# Patient Record
Sex: FEMALE | Employment: UNEMPLOYED | URBAN - METROPOLITAN AREA
[De-identification: names, ages, dates, MRNs, and addresses within clinical notes are randomized per-mention and may not be internally consistent; named-entity substitution may affect disease eponyms.]

---

## 2017-03-28 ENCOUNTER — HOSPITAL ENCOUNTER (OUTPATIENT)
Dept: MAMMOGRAPHY | Age: 72
Discharge: HOME | End: 2017-03-28
Attending: INTERNAL MEDICINE
Payer: MEDICARE

## 2017-03-28 DIAGNOSIS — Z12.31 VISIT FOR SCREENING MAMMOGRAM: ICD-10-CM

## 2017-03-28 PROCEDURE — 77063 BREAST TOMOSYNTHESIS BI: CPT

## 2017-06-26 ENCOUNTER — HOSPITAL ENCOUNTER (OUTPATIENT)
Dept: LAB | Age: 72
Discharge: HOME | End: 2017-06-26
Payer: MEDICARE

## 2017-06-26 DIAGNOSIS — E55.9 VITAMIN D DEFICIENCY: ICD-10-CM

## 2017-06-26 DIAGNOSIS — K21.9 GASTROESOPHAGEAL REFLUX DISEASE: ICD-10-CM

## 2017-06-26 DIAGNOSIS — I10 HTN (HYPERTENSION): ICD-10-CM

## 2017-06-26 DIAGNOSIS — E11.9 DIABETES MELLITUS (HCC): ICD-10-CM

## 2017-06-26 DIAGNOSIS — E78.00 HYPERCHOLESTEROLEMIA: ICD-10-CM

## 2017-06-26 LAB
25(OH)D3 SERPL-MCNC: 41 NG/ML (ref 30–100)
ALBUMIN SERPL BCP-MCNC: 3.4 G/DL (ref 3.4–5)
ALBUMIN/GLOB SERPL: 0.9 {RATIO} (ref 0.8–1.7)
ALP SERPL-CCNC: 71 U/L (ref 45–117)
ALT SERPL-CCNC: 15 U/L (ref 13–56)
ANION GAP BLD CALC-SCNC: 6 MMOL/L (ref 3–18)
AST SERPL W P-5'-P-CCNC: 10 U/L (ref 15–37)
BASOPHILS # BLD AUTO: 0 K/UL (ref 0–0.06)
BASOPHILS # BLD: 0 % (ref 0–2)
BILIRUB SERPL-MCNC: 0.6 MG/DL (ref 0.2–1)
BUN SERPL-MCNC: 25 MG/DL (ref 7–18)
BUN/CREAT SERPL: 22 (ref 12–20)
CALCIUM SERPL-MCNC: 9 MG/DL (ref 8.5–10.1)
CHLORIDE SERPL-SCNC: 105 MMOL/L (ref 100–108)
CHOLEST SERPL-MCNC: 216 MG/DL
CO2 SERPL-SCNC: 32 MMOL/L (ref 21–32)
CREAT SERPL-MCNC: 1.15 MG/DL (ref 0.6–1.3)
CREAT UR-MCNC: 199 MG/DL (ref 30–125)
DIFFERENTIAL METHOD BLD: ABNORMAL
EOSINOPHIL # BLD: 0.1 K/UL (ref 0–0.4)
EOSINOPHIL NFR BLD: 3 % (ref 0–5)
ERYTHROCYTE [DISTWIDTH] IN BLOOD BY AUTOMATED COUNT: 13.1 % (ref 11.6–14.5)
GLOBULIN SER CALC-MCNC: 3.8 G/DL (ref 2–4)
GLUCOSE SERPL-MCNC: 126 MG/DL (ref 74–99)
HBA1C MFR BLD: 7.7 % (ref 4.2–5.6)
HCT VFR BLD AUTO: 37 % (ref 35–45)
HDLC SERPL-MCNC: 48 MG/DL (ref 40–60)
HDLC SERPL: 4.5 {RATIO} (ref 0–5)
HGB BLD-MCNC: 11.6 G/DL (ref 12–16)
LDLC SERPL CALC-MCNC: 138.4 MG/DL (ref 0–100)
LIPID PROFILE,FLP: ABNORMAL
LYMPHOCYTES # BLD AUTO: 35 % (ref 21–52)
LYMPHOCYTES # BLD: 1.7 K/UL (ref 0.9–3.6)
MCH RBC QN AUTO: 29.2 PG (ref 24–34)
MCHC RBC AUTO-ENTMCNC: 31.4 G/DL (ref 31–37)
MCV RBC AUTO: 93.2 FL (ref 74–97)
MICROALBUMIN UR-MCNC: 8.71 MG/DL (ref 0–3)
MICROALBUMIN/CREAT UR-RTO: 44 MG/G (ref 0–30)
MONOCYTES # BLD: 0.6 K/UL (ref 0.05–1.2)
MONOCYTES NFR BLD AUTO: 12 % (ref 3–10)
NEUTS SEG # BLD: 2.5 K/UL (ref 1.8–8)
NEUTS SEG NFR BLD AUTO: 50 % (ref 40–73)
PLATELET # BLD AUTO: 275 K/UL (ref 135–420)
PMV BLD AUTO: 9.6 FL (ref 9.2–11.8)
POTASSIUM SERPL-SCNC: 3.8 MMOL/L (ref 3.5–5.5)
PROT SERPL-MCNC: 7.2 G/DL (ref 6.4–8.2)
RBC # BLD AUTO: 3.97 M/UL (ref 4.2–5.3)
SODIUM SERPL-SCNC: 143 MMOL/L (ref 136–145)
T4 SERPL-MCNC: 10.1 UG/DL (ref 4.5–10.9)
TRIGL SERPL-MCNC: 148 MG/DL (ref ?–150)
TSH SERPL DL<=0.05 MIU/L-ACNC: 0.77 UIU/ML (ref 0.36–3.74)
VLDLC SERPL CALC-MCNC: 29.6 MG/DL
WBC # BLD AUTO: 5 K/UL (ref 4.6–13.2)

## 2017-06-26 PROCEDURE — 84436 ASSAY OF TOTAL THYROXINE: CPT | Performed by: INTERNAL MEDICINE

## 2017-06-26 PROCEDURE — 82043 UR ALBUMIN QUANTITATIVE: CPT | Performed by: INTERNAL MEDICINE

## 2017-06-26 PROCEDURE — 36415 COLL VENOUS BLD VENIPUNCTURE: CPT | Performed by: INTERNAL MEDICINE

## 2017-06-26 PROCEDURE — 84443 ASSAY THYROID STIM HORMONE: CPT | Performed by: INTERNAL MEDICINE

## 2017-06-26 PROCEDURE — 80053 COMPREHEN METABOLIC PANEL: CPT | Performed by: INTERNAL MEDICINE

## 2017-06-26 PROCEDURE — 82306 VITAMIN D 25 HYDROXY: CPT | Performed by: INTERNAL MEDICINE

## 2017-06-26 PROCEDURE — 80061 LIPID PANEL: CPT | Performed by: INTERNAL MEDICINE

## 2017-06-26 PROCEDURE — 85025 COMPLETE CBC W/AUTO DIFF WBC: CPT | Performed by: INTERNAL MEDICINE

## 2017-06-26 PROCEDURE — 83036 HEMOGLOBIN GLYCOSYLATED A1C: CPT | Performed by: INTERNAL MEDICINE

## 2018-04-03 ENCOUNTER — HOSPITAL ENCOUNTER (OUTPATIENT)
Dept: LAB | Age: 73
Discharge: HOME | End: 2018-04-03
Payer: MEDICARE

## 2018-04-03 DIAGNOSIS — I10 ESSENTIAL HYPERTENSION, MALIGNANT: ICD-10-CM

## 2018-04-03 DIAGNOSIS — E78.00 PURE HYPERCHOLESTEROLEMIA: ICD-10-CM

## 2018-04-03 DIAGNOSIS — E55.9 AVITAMINOSIS D: ICD-10-CM

## 2018-04-03 DIAGNOSIS — E11.9 DIABETES MELLITUS (HCC): ICD-10-CM

## 2018-04-03 DIAGNOSIS — K21.9 ESOPHAGEAL REFLUX: ICD-10-CM

## 2018-04-03 LAB
ALBUMIN SERPL-MCNC: 3.6 G/DL (ref 3.4–5)
ALBUMIN/GLOB SERPL: 1 {RATIO} (ref 0.8–1.7)
ALP SERPL-CCNC: 76 U/L (ref 45–117)
ALT SERPL-CCNC: 18 U/L (ref 13–56)
ANION GAP SERPL CALC-SCNC: 6 MMOL/L (ref 3–18)
AST SERPL-CCNC: 11 U/L (ref 15–37)
BASOPHILS # BLD: 0 K/UL (ref 0–0.06)
BASOPHILS NFR BLD: 0 % (ref 0–2)
BILIRUB SERPL-MCNC: 0.7 MG/DL (ref 0.2–1)
BUN SERPL-MCNC: 18 MG/DL (ref 7–18)
BUN/CREAT SERPL: 17 (ref 12–20)
CALCIUM SERPL-MCNC: 9.3 MG/DL (ref 8.5–10.1)
CHLORIDE SERPL-SCNC: 102 MMOL/L (ref 100–108)
CHOLEST SERPL-MCNC: 176 MG/DL
CO2 SERPL-SCNC: 33 MMOL/L (ref 21–32)
CREAT SERPL-MCNC: 1.05 MG/DL (ref 0.6–1.3)
DIFFERENTIAL METHOD BLD: ABNORMAL
EOSINOPHIL # BLD: 0.1 K/UL (ref 0–0.4)
EOSINOPHIL NFR BLD: 3 % (ref 0–5)
ERYTHROCYTE [DISTWIDTH] IN BLOOD BY AUTOMATED COUNT: 13 % (ref 11.6–14.5)
EST. AVERAGE GLUCOSE BLD GHB EST-MCNC: 171 MG/DL
GLOBULIN SER CALC-MCNC: 3.7 G/DL (ref 2–4)
GLUCOSE SERPL-MCNC: 124 MG/DL (ref 74–99)
HBA1C MFR BLD: 7.6 % (ref 4.2–5.6)
HCT VFR BLD AUTO: 37.8 % (ref 35–45)
HDLC SERPL-MCNC: 44 MG/DL (ref 40–60)
HDLC SERPL: 4 {RATIO} (ref 0–5)
HGB BLD-MCNC: 12.3 G/DL (ref 12–16)
LDLC SERPL CALC-MCNC: 91.4 MG/DL (ref 0–100)
LIPID PROFILE,FLP: ABNORMAL
LYMPHOCYTES # BLD: 1.9 K/UL (ref 0.9–3.6)
LYMPHOCYTES NFR BLD: 45 % (ref 21–52)
MCH RBC QN AUTO: 29.9 PG (ref 24–34)
MCHC RBC AUTO-ENTMCNC: 32.5 G/DL (ref 31–37)
MCV RBC AUTO: 92 FL (ref 74–97)
MONOCYTES # BLD: 0.5 K/UL (ref 0.05–1.2)
MONOCYTES NFR BLD: 12 % (ref 3–10)
NEUTS SEG # BLD: 1.8 K/UL (ref 1.8–8)
NEUTS SEG NFR BLD: 40 % (ref 40–73)
PLATELET # BLD AUTO: 249 K/UL (ref 135–420)
PMV BLD AUTO: 9.7 FL (ref 9.2–11.8)
POTASSIUM SERPL-SCNC: 4.1 MMOL/L (ref 3.5–5.5)
PROT SERPL-MCNC: 7.3 G/DL (ref 6.4–8.2)
RBC # BLD AUTO: 4.11 M/UL (ref 4.2–5.3)
SODIUM SERPL-SCNC: 141 MMOL/L (ref 136–145)
T4 FREE SERPL-MCNC: 1 NG/DL (ref 0.7–1.5)
TRIGL SERPL-MCNC: 203 MG/DL (ref ?–150)
TSH SERPL DL<=0.05 MIU/L-ACNC: 1.54 UIU/ML (ref 0.36–3.74)
VLDLC SERPL CALC-MCNC: 40.6 MG/DL
WBC # BLD AUTO: 4.4 K/UL (ref 4.6–13.2)

## 2018-04-03 PROCEDURE — 84443 ASSAY THYROID STIM HORMONE: CPT | Performed by: INTERNAL MEDICINE

## 2018-04-03 PROCEDURE — 80061 LIPID PANEL: CPT | Performed by: INTERNAL MEDICINE

## 2018-04-03 PROCEDURE — 80053 COMPREHEN METABOLIC PANEL: CPT | Performed by: INTERNAL MEDICINE

## 2018-04-03 PROCEDURE — 84439 ASSAY OF FREE THYROXINE: CPT | Performed by: INTERNAL MEDICINE

## 2018-04-03 PROCEDURE — 36415 COLL VENOUS BLD VENIPUNCTURE: CPT | Performed by: INTERNAL MEDICINE

## 2018-04-03 PROCEDURE — 85025 COMPLETE CBC W/AUTO DIFF WBC: CPT | Performed by: INTERNAL MEDICINE

## 2018-04-03 PROCEDURE — 83036 HEMOGLOBIN GLYCOSYLATED A1C: CPT | Performed by: INTERNAL MEDICINE

## 2018-05-15 ENCOUNTER — HOSPITAL ENCOUNTER (OUTPATIENT)
Dept: MAMMOGRAPHY | Age: 73
Discharge: HOME | End: 2018-05-15
Attending: INTERNAL MEDICINE
Payer: MEDICARE

## 2018-05-15 DIAGNOSIS — Z12.31 VISIT FOR SCREENING MAMMOGRAM: ICD-10-CM

## 2018-05-15 PROCEDURE — 77067 SCR MAMMO BI INCL CAD: CPT

## 2018-11-28 ENCOUNTER — HOSPITAL ENCOUNTER (OUTPATIENT)
Dept: LAB | Age: 73
Discharge: HOME | End: 2018-11-28
Payer: MEDICARE

## 2018-11-28 DIAGNOSIS — I10 ESSENTIAL HYPERTENSION, MALIGNANT: ICD-10-CM

## 2018-11-28 DIAGNOSIS — K21.9 ESOPHAGEAL REFLUX: ICD-10-CM

## 2018-11-28 DIAGNOSIS — E11.9 DIABETES MELLITUS (HCC): ICD-10-CM

## 2018-11-28 DIAGNOSIS — E78.00 PURE HYPERCHOLESTEROLEMIA: ICD-10-CM

## 2018-11-28 DIAGNOSIS — E55.9 AVITAMINOSIS D: ICD-10-CM

## 2018-11-28 LAB
25(OH)D3 SERPL-MCNC: 19.4 NG/ML (ref 30–100)
ALBUMIN SERPL-MCNC: 3.7 G/DL (ref 3.4–5)
ALBUMIN/GLOB SERPL: 1 {RATIO} (ref 0.8–1.7)
ALP SERPL-CCNC: 82 U/L (ref 45–117)
ALT SERPL-CCNC: 18 U/L (ref 13–56)
ANION GAP SERPL CALC-SCNC: 5 MMOL/L (ref 3–18)
AST SERPL-CCNC: 12 U/L (ref 15–37)
BASOPHILS # BLD: 0 K/UL (ref 0–0.1)
BASOPHILS NFR BLD: 0 % (ref 0–2)
BILIRUB SERPL-MCNC: 0.7 MG/DL (ref 0.2–1)
BUN SERPL-MCNC: 18 MG/DL (ref 7–18)
BUN/CREAT SERPL: 18 (ref 12–20)
CALCIUM SERPL-MCNC: 9.3 MG/DL (ref 8.5–10.1)
CHLORIDE SERPL-SCNC: 103 MMOL/L (ref 100–108)
CHOLEST SERPL-MCNC: 259 MG/DL
CO2 SERPL-SCNC: 31 MMOL/L (ref 21–32)
CREAT SERPL-MCNC: 1.02 MG/DL (ref 0.6–1.3)
CREAT UR-MCNC: 65.4 MG/DL (ref 30–125)
DIFFERENTIAL METHOD BLD: ABNORMAL
EOSINOPHIL # BLD: 0.4 K/UL (ref 0–0.4)
EOSINOPHIL NFR BLD: 7 % (ref 0–5)
ERYTHROCYTE [DISTWIDTH] IN BLOOD BY AUTOMATED COUNT: 12.6 % (ref 11.6–14.5)
GLOBULIN SER CALC-MCNC: 3.8 G/DL (ref 2–4)
GLUCOSE SERPL-MCNC: 130 MG/DL (ref 74–99)
HBA1C MFR BLD: 8.4 % (ref 4.2–5.6)
HCT VFR BLD AUTO: 41.4 % (ref 35–45)
HDLC SERPL-MCNC: 50 MG/DL (ref 40–60)
HDLC SERPL: 5.2 {RATIO} (ref 0–5)
HGB BLD-MCNC: 13.3 G/DL (ref 12–16)
LDLC SERPL CALC-MCNC: 165.4 MG/DL (ref 0–100)
LIPID PROFILE,FLP: ABNORMAL
LYMPHOCYTES # BLD: 1.9 K/UL (ref 0.9–3.6)
LYMPHOCYTES NFR BLD: 35 % (ref 21–52)
MCH RBC QN AUTO: 29.6 PG (ref 24–34)
MCHC RBC AUTO-ENTMCNC: 32.1 G/DL (ref 31–37)
MCV RBC AUTO: 92 FL (ref 74–97)
MICROALBUMIN UR-MCNC: 11.5 MG/DL (ref 0–3)
MICROALBUMIN/CREAT UR-RTO: 176 MG/G (ref 0–30)
MONOCYTES # BLD: 0.4 K/UL (ref 0.05–1.2)
MONOCYTES NFR BLD: 8 % (ref 3–10)
NEUTS SEG # BLD: 2.7 K/UL (ref 1.8–8)
NEUTS SEG NFR BLD: 50 % (ref 40–73)
PLATELET # BLD AUTO: 273 K/UL (ref 135–420)
PMV BLD AUTO: 9.6 FL (ref 9.2–11.8)
POTASSIUM SERPL-SCNC: 3.7 MMOL/L (ref 3.5–5.5)
PROT SERPL-MCNC: 7.5 G/DL (ref 6.4–8.2)
RBC # BLD AUTO: 4.5 M/UL (ref 4.2–5.3)
SODIUM SERPL-SCNC: 139 MMOL/L (ref 136–145)
T4 SERPL-MCNC: 11.8 UG/DL (ref 4.7–13.3)
TRIGL SERPL-MCNC: 218 MG/DL (ref ?–150)
TSH SERPL DL<=0.05 MIU/L-ACNC: 1.85 UIU/ML (ref 0.36–3.74)
VLDLC SERPL CALC-MCNC: 43.6 MG/DL
WBC # BLD AUTO: 5.3 K/UL (ref 4.6–13.2)

## 2018-11-28 PROCEDURE — 85025 COMPLETE CBC W/AUTO DIFF WBC: CPT

## 2018-11-28 PROCEDURE — 82306 VITAMIN D 25 HYDROXY: CPT

## 2018-11-28 PROCEDURE — 82043 UR ALBUMIN QUANTITATIVE: CPT

## 2018-11-28 PROCEDURE — 80061 LIPID PANEL: CPT

## 2018-11-28 PROCEDURE — 36415 COLL VENOUS BLD VENIPUNCTURE: CPT

## 2018-11-28 PROCEDURE — 84443 ASSAY THYROID STIM HORMONE: CPT

## 2018-11-28 PROCEDURE — 83036 HEMOGLOBIN GLYCOSYLATED A1C: CPT

## 2018-11-28 PROCEDURE — 80053 COMPREHEN METABOLIC PANEL: CPT

## 2018-11-28 PROCEDURE — 84436 ASSAY OF TOTAL THYROXINE: CPT

## 2019-04-26 ENCOUNTER — OFFICE VISIT (OUTPATIENT)
Dept: ORTHOPEDIC SURGERY | Facility: CLINIC | Age: 74
End: 2019-04-26

## 2019-04-26 VITALS
HEIGHT: 67 IN | HEART RATE: 71 BPM | RESPIRATION RATE: 16 BRPM | WEIGHT: 235 LBS | BODY MASS INDEX: 36.88 KG/M2 | TEMPERATURE: 97.5 F | DIASTOLIC BLOOD PRESSURE: 85 MMHG | OXYGEN SATURATION: 97 % | SYSTOLIC BLOOD PRESSURE: 169 MMHG

## 2019-04-26 DIAGNOSIS — M54.50 LUMBAR PAIN: ICD-10-CM

## 2019-04-26 DIAGNOSIS — M54.32 BILATERAL SCIATICA: Primary | ICD-10-CM

## 2019-04-26 DIAGNOSIS — M54.31 BILATERAL SCIATICA: Primary | ICD-10-CM

## 2019-04-26 PROBLEM — E66.01 SEVERE OBESITY (HCC): Status: ACTIVE | Noted: 2019-04-26

## 2019-04-26 RX ORDER — AMLODIPINE BESYLATE 10 MG/1
TABLET ORAL DAILY
COMMUNITY

## 2019-04-26 RX ORDER — GLYBURIDE-METFORMIN HYDROCHLORIDE 5; 500 MG/1; MG/1
1 TABLET ORAL
COMMUNITY

## 2019-04-26 RX ORDER — GABAPENTIN 100 MG/1
CAPSULE ORAL 3 TIMES DAILY
COMMUNITY

## 2019-04-26 RX ORDER — BETAMETHASONE SODIUM PHOSPHATE AND BETAMETHASONE ACETATE 3; 3 MG/ML; MG/ML
6 INJECTION, SUSPENSION INTRA-ARTICULAR; INTRALESIONAL; INTRAMUSCULAR; SOFT TISSUE ONCE
Qty: 0.5 ML | Refills: 0
Start: 2019-04-26 | End: 2019-04-26

## 2019-04-26 NOTE — PROGRESS NOTES
Patient: Radha Branch                MRN: [id number]       SSN: [id number] YOB: 1945        AGE: 72 y.o. SEX: female PCP: Laxmi Vela MD 
04/26/19 Chief Complaint Patient presents with  Leg Pain  
  sunil  leg pain HISTORY:  Radha Branch is a 72 y.o. female who is seen for R>L buttock and radiating leg pain. She notes that she has lumpy areas in her lower legs. She has significant buttock pain. She has pain with sitting and laying that is alleviated with standing. She has to sit in a certain position to decrease her radiating leg pain. She has no knee, groin, or lateral hip pain. Her pain is localized to her buttock radiating to her lower extremities. She has been seeing her PCP and being treated for sciatica. Pain Assessment  4/26/2019 Location of Pain Leg Location Modifiers Left;Right Severity of Pain 5 Quality of Pain Aching Duration of Pain A few minutes Frequency of Pain Intermittent Aggravating Factors Stairs; Walking;Standing Limiting Behavior Some Relieving Factors Rest  
Result of Injury No  
 
Occupation, etc:  Ms. Ceci Callahan is a retired as of 6 years as a  for the [employer]. 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 She lives in [place] alone. She has a son and a daughter. She has 4 grand sons with one in the area. She is a metformin dependent diabetic. Ms. Ceci Callahan weighs 235 lbs and is 5'7\" tall. Lab Results Component Value Date/Time Hemoglobin A1c 8.4 (H) 11/28/2018 11:39 AM  
 
Weight Metrics 4/26/2019 4/11/2013 Weight 235 lb 253 lb BMI 36.81 kg/m2 39.62 kg/m2 There is no problem list on file for this patient. REVIEW OF SYSTEMS: All Below are Negative except: See HPI Constitutional: negative for fever, chills, and weight loss.  
 Cardiovascular: negative for chest pain, claudication, leg swelling, SOB, MCGRAW 
 Gastrointestinal: Negative for pain, N/V/C/D, Blood in stool or urine, dysuria, hematuria, incontinence, pelvic pain. Musculoskeletal: See HPI Neurological: Negative for dizziness and weakness. Negative for headaches, Visual changes, confusion, seizures Phychiatric/Behavioral: Negative for depression, memory loss, substance abuse. Extremities: Negative for hair changes, rash, or skin lesion changes. Hematologic: Negative for bleeding problems, bruising, pallor or swollen lymph nodes Peripheral Vascular: No calf pain, no circulation deficits. Social History Socioeconomic History  Marital status:  Spouse name: Not on file  Number of children: Not on file  Years of education: Not on file  Highest education level: Not on file Occupational History  Not on file Social Needs  Financial resource strain: Not on file  Food insecurity:  
  Worry: Not on file Inability: Not on file  Transportation needs:  
  Medical: Not on file Non-medical: Not on file Tobacco Use  Smoking status: Former Smoker Years: 10.00 Last attempt to quit: 1998 Years since quittin.3  Smokeless tobacco: Never Used Substance and Sexual Activity  Alcohol use: No  
 Drug use: No  
 Sexual activity: Not on file Lifestyle  Physical activity:  
  Days per week: Not on file Minutes per session: Not on file  Stress: Not on file Relationships  Social connections:  
  Talks on phone: Not on file Gets together: Not on file Attends Rastafarian service: Not on file Active member of club or organization: Not on file Attends meetings of clubs or organizations: Not on file Relationship status: Not on file  Intimate partner violence:  
  Fear of current or ex partner: Not on file Emotionally abused: Not on file Physically abused: Not on file Forced sexual activity: Not on file Other Topics Concern  Not on file Social History Narrative  Not on file No Known Allergies Current Outpatient Medications Medication Sig  
 amLODIPine (NORVASC) 10 mg tablet Take  by mouth daily.  gabapentin (NEURONTIN) 100 mg capsule Take  by mouth three (3) times daily.  glyBURIDE-metFORMIN (GLUCOVANCE) 5-500 mg per tablet Take 1 Tab by mouth Daily (before breakfast).  metoprolol (LOPRESSOR) 100 mg tablet Take 100 mg by mouth daily.  lisinopril-hydrochlorothiazide (PRINZIDE, ZESTORETIC) 20-25 mg per tablet Take 1 Tab by mouth daily.  simvastatin (ZOCOR) 40 mg tablet Take 40 mg by mouth nightly.  aspirin 81 mg tablet Take 81 mg by mouth daily.  Cholecalciferol, Vitamin D3, (VITAMIN D3) 5,000 unit Tab Take 1 Tab by mouth daily.  glipizide-metformin (METAGLIP) 5-500 mg per tablet Take 1 Tab by mouth Before breakfast and dinner. No current facility-administered medications for this visit. PHYSICAL EXAMINATION: 
Visit Vitals /85 Pulse 71 Temp 97.5 °F (36.4 °C) (Oral) Resp 16 Ht 5' 7\" (1.702 m) Wt 235 lb (106.6 kg) SpO2 97% BMI 36.81 kg/m² ORTHO EXAMINATION: 
Examination Lumbar Thoracic Skin Intact Intact Tenderness + paralumbar  - Tightness + paralumbar  - Lordosis Normal N/A Kyphosis N/A Normal  
Scoliosis - - Flexion Fingertips to ankle N/A Extension 10 N/A Knee reflexes Normal N/A Ankle reflexes Normal N/A Straight leg raise - N/A Calf tenderness - N/A Examination Right knee Left knee Skin Intact Intact Range of motion 120-0 120-0 Effusion - - Medial joint line tenderness + + Lateral joint line tenderness - - Popliteal tenderness - -  
Osteophytes palpable + + Bernices - - Patella crepitus + + Anterior drawer - - Lateral laxity - - Medial laxity - - Varus deformity - -  
Valgus deformity - - Pretibial edema +1 +1 Calf tenderness - -  
 
TIME OUT: 
Chart reviewed for the following: Villa Mcardle, MD, have reviewed the History, Physical and updated the Allergic reactions for Radha Richardson TIME OUT performed immediately prior to start of procedure: 
Villa Mcardle, MD, have performed the following reviews on Radha Richardson prior to the start of the procedure:         
* Patient was identified by name and date of birth * Agreement on procedure being performed was verified * Risks and Benefits explained to the patient * Procedure site verified and marked as necessary * Patient was positioned for comfort * Consent was obtained Time: 1:39 PM  
 
Date of procedure: 4/26/2019 Procedure performed by:  Jaun Jacobo MD 
Ms. Jaclyn Edwards tolerated the procedure well with no complications. IMPRESSION:   
  ICD-10-CM ICD-9-CM 1. Bilateral sciatica M54.31 724.3 betamethasone (CELESTONE SOLUSPAN) 6 mg/mL injection M54.32  BETAMETHASONE ACETATE & SODIUM PHOSPHATE INJECTION 3 MG EA. INJECT TRIGGER POINT, 1 OR 2 REFERRAL TO SPINE SURGERY 2. Lumbar pain M54.5 724.2 betamethasone (CELESTONE SOLUSPAN) 6 mg/mL injection BETAMETHASONE ACETATE & SODIUM PHOSPHATE INJECTION 3 MG EA. INJECT TRIGGER POINT, 1 OR 2 REFERRAL TO SPINE SURGERY  
 
PLAN:  After discussing treatment options, patient's paralumbar regions were injected with 4 cc Marcaine and 1/2 cc Celestone. There is no need for surgery at this time. She will follow up at the spine center.    
 
Scribed by Cassandra Dobbs ([address]) as dictated by Jaun Jacobo MD

## 2019-07-17 ENCOUNTER — HOSPITAL ENCOUNTER (OUTPATIENT)
Dept: LAB | Age: 74
Discharge: HOME | End: 2019-07-17
Payer: MEDICARE

## 2019-07-17 DIAGNOSIS — I10 PRIMARY HYPERTENSION: ICD-10-CM

## 2019-07-17 DIAGNOSIS — K21.9 ESOPHAGEAL REFLUX: ICD-10-CM

## 2019-07-17 DIAGNOSIS — E78.00 PURE HYPERCHOLESTEROLEMIA: ICD-10-CM

## 2019-07-17 DIAGNOSIS — E55.9 VITAMIN D DEFICIENCY: ICD-10-CM

## 2019-07-17 DIAGNOSIS — E11.9 DIABETES MELLITUS (HCC): ICD-10-CM

## 2019-07-17 LAB
25(OH)D3 SERPL-MCNC: 49.8 NG/ML (ref 30–100)
ALBUMIN SERPL-MCNC: 3.9 G/DL (ref 3.4–5)
ALBUMIN/GLOB SERPL: 1.1 {RATIO} (ref 0.8–1.7)
ALP SERPL-CCNC: 78 U/L (ref 45–117)
ALT SERPL-CCNC: 23 U/L (ref 13–56)
ANION GAP SERPL CALC-SCNC: 6 MMOL/L (ref 3–18)
AST SERPL-CCNC: 19 U/L (ref 15–37)
BASOPHILS # BLD: 0 K/UL (ref 0–0.06)
BASOPHILS NFR BLD: 0 % (ref 0–3)
BILIRUB SERPL-MCNC: 0.8 MG/DL (ref 0.2–1)
BUN SERPL-MCNC: 23 MG/DL (ref 7–18)
BUN/CREAT SERPL: 20 (ref 12–20)
CALCIUM SERPL-MCNC: 9.2 MG/DL (ref 8.5–10.1)
CHLORIDE SERPL-SCNC: 104 MMOL/L (ref 100–108)
CHOLEST SERPL-MCNC: 216 MG/DL
CO2 SERPL-SCNC: 32 MMOL/L (ref 21–32)
CREAT SERPL-MCNC: 1.15 MG/DL (ref 0.6–1.3)
DIFFERENTIAL METHOD BLD: ABNORMAL
EOSINOPHIL # BLD: 0.2 K/UL (ref 0–0.4)
EOSINOPHIL NFR BLD: 4 % (ref 0–5)
ERYTHROCYTE [DISTWIDTH] IN BLOOD BY AUTOMATED COUNT: 13.2 % (ref 11.6–14.5)
GLOBULIN SER CALC-MCNC: 3.7 G/DL (ref 2–4)
GLUCOSE SERPL-MCNC: 145 MG/DL (ref 74–99)
HCT VFR BLD AUTO: 39.3 % (ref 35–45)
HDLC SERPL-MCNC: 60 MG/DL (ref 40–60)
HDLC SERPL: 3.6 {RATIO} (ref 0–5)
HGB BLD-MCNC: 12.9 G/DL (ref 12–16)
LDLC SERPL CALC-MCNC: 127.8 MG/DL (ref 0–100)
LIPID PROFILE,FLP: ABNORMAL
LYMPHOCYTES # BLD: 1.9 K/UL (ref 0.8–3.5)
LYMPHOCYTES NFR BLD: 44 % (ref 20–51)
MCH RBC QN AUTO: 30.5 PG (ref 24–34)
MCHC RBC AUTO-ENTMCNC: 32.8 G/DL (ref 31–37)
MCV RBC AUTO: 92.9 FL (ref 74–97)
MONOCYTES # BLD: 0.5 K/UL (ref 0–1)
MONOCYTES NFR BLD: 12 % (ref 2–9)
NEUTS SEG # BLD: 1.8 K/UL (ref 1.8–8)
NEUTS SEG NFR BLD: 40 % (ref 42–75)
PLATELET # BLD AUTO: 251 K/UL (ref 135–420)
PLATELET COMMENTS,PCOM: ABNORMAL
PMV BLD AUTO: 9.8 FL (ref 9.2–11.8)
POTASSIUM SERPL-SCNC: 3.9 MMOL/L (ref 3.5–5.5)
PROT SERPL-MCNC: 7.6 G/DL (ref 6.4–8.2)
RBC # BLD AUTO: 4.23 M/UL (ref 4.2–5.3)
RBC MORPH BLD: ABNORMAL
SODIUM SERPL-SCNC: 142 MMOL/L (ref 136–145)
TRIGL SERPL-MCNC: 141 MG/DL (ref ?–150)
TSH SERPL DL<=0.05 MIU/L-ACNC: 1.4 UIU/ML (ref 0.36–3.74)
VLDLC SERPL CALC-MCNC: 28.2 MG/DL
WBC # BLD AUTO: 4.4 K/UL (ref 4.6–13.2)

## 2019-07-17 PROCEDURE — 36415 COLL VENOUS BLD VENIPUNCTURE: CPT

## 2019-07-17 PROCEDURE — 80061 LIPID PANEL: CPT

## 2019-07-17 PROCEDURE — 84436 ASSAY OF TOTAL THYROXINE: CPT

## 2019-07-17 PROCEDURE — 84443 ASSAY THYROID STIM HORMONE: CPT

## 2019-07-17 PROCEDURE — 85025 COMPLETE CBC W/AUTO DIFF WBC: CPT

## 2019-07-17 PROCEDURE — 82306 VITAMIN D 25 HYDROXY: CPT

## 2019-07-17 PROCEDURE — 80053 COMPREHEN METABOLIC PANEL: CPT

## 2019-07-18 LAB — T4 SERPL-MCNC: 9.5 UG/DL (ref 4.8–13.9)

## 2019-09-12 ENCOUNTER — HOSPITAL ENCOUNTER (OUTPATIENT)
Dept: MAMMOGRAPHY | Age: 74
Discharge: HOME | End: 2019-09-12
Attending: INTERNAL MEDICINE
Payer: MEDICARE

## 2019-09-12 DIAGNOSIS — Z12.39 BREAST SCREENING, UNSPECIFIED: ICD-10-CM

## 2019-09-12 PROCEDURE — 77067 SCR MAMMO BI INCL CAD: CPT

## 2019-10-30 ENCOUNTER — HOSPITAL ENCOUNTER (OUTPATIENT)
Dept: GENERAL RADIOLOGY | Age: 74
Discharge: HOME | End: 2019-10-30
Payer: MEDICARE

## 2019-10-30 DIAGNOSIS — M54.9 BACK PAIN: ICD-10-CM

## 2019-10-30 PROCEDURE — 72110 X-RAY EXAM L-2 SPINE 4/>VWS: CPT

## 2019-10-30 PROCEDURE — 73502 X-RAY EXAM HIP UNI 2-3 VIEWS: CPT

## 2019-12-10 ENCOUNTER — OFFICE VISIT (OUTPATIENT)
Dept: ORTHOPEDIC SURGERY | Age: 74
End: 2019-12-10

## 2019-12-10 VITALS
OXYGEN SATURATION: 96 % | DIASTOLIC BLOOD PRESSURE: 110 MMHG | RESPIRATION RATE: 16 BRPM | BODY MASS INDEX: 38.3 KG/M2 | SYSTOLIC BLOOD PRESSURE: 212 MMHG | WEIGHT: 244 LBS | HEIGHT: 67 IN | HEART RATE: 75 BPM | TEMPERATURE: 97.6 F

## 2019-12-10 DIAGNOSIS — M51.36 DDD (DEGENERATIVE DISC DISEASE), LUMBAR: Primary | ICD-10-CM

## 2019-12-10 DIAGNOSIS — M62.838 MUSCLE SPASM: ICD-10-CM

## 2019-12-10 DIAGNOSIS — M47.816 LUMBAR FACET ARTHROPATHY: ICD-10-CM

## 2019-12-10 DIAGNOSIS — R03.0 ELEVATED BLOOD PRESSURE READING: ICD-10-CM

## 2019-12-10 DIAGNOSIS — R26.9 GAIT ABNORMALITY: ICD-10-CM

## 2019-12-10 DIAGNOSIS — E66.01 CLASS 2 SEVERE OBESITY WITH SERIOUS COMORBIDITY AND BODY MASS INDEX (BMI) OF 39.0 TO 39.9 IN ADULT, UNSPECIFIED OBESITY TYPE (HCC): ICD-10-CM

## 2019-12-10 NOTE — PROGRESS NOTES
[facility]  [address], [address]  Phone: [phone]  Fax: [phone]    NEW PATIENT  Pt's YOB: 1945    ASSESSMENT   Diagnoses and all orders for this visit:    1. DDD (degenerative disc disease), lumbar  -     REFERRAL TO PHYSICAL THERAPY    2. Lumbar facet arthropathy  -     REFERRAL TO PHYSICAL THERAPY    3. Elevated blood pressure reading    4. Muscle spasm  -     REFERRAL TO PHYSICAL THERAPY    5. Gait abnormality  -     REFERRAL TO PHYSICAL THERAPY    6. Class 2 severe obesity with serious comorbidity and body mass index (BMI) of 39.0 to 39.9 in adult, unspecified obesity type ([facility] [address]         IMPRESSION AND PLAN:  Nohemy Fu is a 69 y.o.  female with history of lumbar pain x 1 year. She complains of low back pain that radiates to both thighs posteriorly. Pt denies any pain going past the knees at this time. She admits she may have some balance problems so she uses her cane. Pt's pain is worse with prolonged sitting or standing. Pt states she has been using Neurontin 300 mg TID  with relief and notes it helps her sleep at night. 1) Pt was given information on lumbar arthritis exercises. 2) She was referred to physical therapy. 3) Discussed ordering MRI after therapy pending response- concern for spinal stenosis  4) She will continue taking Neurontin 300 mg TID. 5) Ms. Shabana Neri has a reminder for a \"due or due soon\" health maintenance. I have asked that she contact her primary care provider, Scott Villarreal MD, for follow-up on this health maintenance-- Dr. Matthews [place] office was contacted and she was sent to his office for further evaluation of her blood pressure this am  6)  demonstrated consistency with prescribing. 7)Weight loss recommended  8) Consider vascular evaluation pending current work up  Follow-up and Dispositions    · Return in about 6 weeks (around 1/21/2020) for PT follow up. HISTORY OF PRESENT ILLNESS:  Cathryn Baker is a 69 y.o.  female with history of lumbar pain x 1 year. Pt presents to the office today as a new patient referred by Mitra Ernst MD . She complains of low back pain that radiates to both thighs posteriorly. Pt denies any pain going past the knees at this time. She denies any leg weakness but admits to leg heaviness when ambulating down the stairs. She denies any trouble with bowel and bladder control. She admits she may have some balance problems so she uses her cane. Pt's pain is worse with prolonged sitting or standing. Pt states she has been using Neurontin 300 mg TID  with relief and notes it helps her sleep at night. Of note she is a non smoker and has not smoked in 30 years. The patient has a history of DM and reports blood sugars are well controlled, consistently remaining below 200. Her blood pressure was substantially elevated in the office and she had taken her blood pressure medicine. She did not have any SOB, palpitations, chest pain, dizziness or headache. Pt at this time desires to proceed with physical therapy.        Pain Scale: 5/10     PCP: Mitra Ernst MD    Past Medical History:   Diagnosis Date    Diabetes ([facility] [address]     Hypercholesteremia     Hypertension         Social History     Socioeconomic History    Marital status:      Spouse name: Not on file    Number of children: Not on file    Years of education: Not on file    Highest education level: Not on file   Occupational History    Not on file   Social Needs    Financial resource strain: Not on file    Food insecurity:     Worry: Not on file     Inability: Not on file    Transportation needs:     Medical: Not on file     Non-medical: Not on file   Tobacco Use    Smoking status: Former Smoker     Years: 10.00     Last attempt to quit: 1998     Years since quittin.9    Smokeless tobacco: Never Used   Substance and Sexual Activity    Alcohol use: No    Drug use: No    Sexual activity: Not on file   Lifestyle    Physical activity:     Days per week: Not on file     Minutes per session: Not on file    Stress: Not on file   Relationships    Social connections:     Talks on phone: Not on file     Gets together: Not on file     Attends Taoism service: Not on file     Active member of club or organization: Not on file     Attends meetings of clubs or organizations: Not on file     Relationship status: Not on file    Intimate partner violence:     Fear of current or ex partner: Not on file     Emotionally abused: Not on file     Physically abused: Not on file     Forced sexual activity: Not on file   Other Topics Concern    Not on file   Social History Narrative    Not on file       Current Outpatient Medications   Medication Sig Dispense Refill    amLODIPine (NORVASC) 10 mg tablet Take  by mouth daily.  gabapentin (NEURONTIN) 100 mg capsule Take  by mouth three (3) times daily.  glyBURIDE-metFORMIN (GLUCOVANCE) 5-500 mg per tablet Take 1 Tab by mouth Daily (before breakfast).  metoprolol (LOPRESSOR) 100 mg tablet Take 100 mg by mouth daily.  glipizide-metformin (METAGLIP) 5-500 mg per tablet Take 1 Tab by mouth Before breakfast and dinner.  lisinopril-hydrochlorothiazide (PRINZIDE, ZESTORETIC) 20-25 mg per tablet Take 1 Tab by mouth daily.  simvastatin (ZOCOR) 40 mg tablet Take 40 mg by mouth nightly.  aspirin 81 mg tablet Take 81 mg by mouth daily.  Cholecalciferol, Vitamin D3, (VITAMIN D3) 5,000 unit Tab Take 1 Tab by mouth daily. No Known Allergies    REVIEW OF SYSTEMS    Constitutional: Negative for fever, chills, or weight change. Respiratory: Negative for cough or shortness of breath. Cardiovascular: Negative for chest pain or palpitations. Gastrointestinal: Negative for acid reflux, change in bowel habits, or constipation. Genitourinary: Negative for dysuria and flank pain.    Musculoskeletal: Positive for lumbar pain. Skin: Negative for rash. Neurological: Negative for headaches, dizziness, or numbness. Endo/Heme/Allergies: Negative for increased bruising. Psychiatric/Behavioral: Negative for difficulty with sleep. PHYSICAL EXAMINATION  Visit Vitals  BP (!) 212/110   Pulse 75   Temp 97.6 °F (36.4 °C) (Oral)   Resp 16   Ht 5' 7\" (1.702 m)   Wt 244 lb (110.7 kg)   SpO2 96%   BMI 38.22 kg/m²       Constitutional: Awake, alert, and in no acute distress. HEENT: Normocephalic. Atraumatic. Oropharynx is moist and clear. PERRL. EOMI. Sclerae are nonicteric  Cardiovascular: Regular rate and rhythm  Lungs: Clear to auscultation bilaterally  Abdomen: Soft and nontender. Bowel sounds are present  Neurological: 1+ symmetrical DTRs in the upper extremities. 1+ symmetrical DTRs in the lower extremities. Sensation to light touch is intact. Negative Eckert's sign bilaterally. Skin: warm, dry, and intact. Musculoskeletal: Tenderness to palpation in the lower lumbar region. Mild pain with extension, axial loading, and no pain with forward flexion. No pain with internal or external rotation of her hips. Negative straight leg raise bilaterally; Pt ambulates with the assistance with a cand      Biceps  Triceps Deltoids Wrist Ext Wrist Flex Hand Intrin   Right +4/5 +4/5 +4/5 +4/5 +4/5 +4/5   Left +4/5 +4/5 +4/5 +4/5 +4/5 +4/5      Hip Flex  Quads Hamstrings Ankle DF EHL Ankle PF   Right +4/5 +4/5 +4/5 +4/5 +4/5 +4/5   Left +4/5 +4/5 +4/5 +4/5 +4/5 +4/5     IMAGING:    Lumbar spine x-rays from 10/30/2019 were personally reviewed with the patient and demonstrated:  EXAM: XR SPINE LUMB MIN 4 V     INDICATION: back pain     COMPARISON: None.     FINDINGS: AP, lateral and spot lateral views of the lumbar spine.      There is normal alignment. Normal vertebral body height. Mild degenerative disc  changes of the lumbar spine. Moderate degenerative disc changes at T12.  There is  no fracture, subluxation or other abnormality. Atherosclerotic calcification of  the aorta.     IMPRESSION: Moderate degenerative disc disease of the lumbar spine no acute  Fracture    Pelvis and right hip x-rays from 10/30/2019 were personally reviewed with the patient and demonstrated:   EXAM: XR SPINE LUMB MIN 4 V     INDICATION: back pain     COMPARISON: None.     FINDINGS: AP, lateral and spot lateral views of the lumbar spine.      There is normal alignment. Normal vertebral body height. Mild degenerative disc  changes of the lumbar spine. Moderate degenerative disc changes at T12. There is  no fracture, subluxation or other abnormality. Atherosclerotic calcification of  the aorta.     IMPRESSION: Moderate degenerative disc disease of the lumbar spine no acute  fracture    Written by Jesús Man, as dictated by Lavelle Camara MD.  I, Dr. Lavelle Camara confirm that all documentation is accurate.

## 2019-12-10 NOTE — PATIENT INSTRUCTIONS
Low Back Arthritis: Exercises  Introduction  Here are some examples of typical rehabilitation exercises for your condition. Start each exercise slowly. Ease off the exercise if you start to have pain. Your doctor or physical therapist will tell you when you can start these exercises and which ones will work best for you. When you are not being active, find a comfortable position for rest. Some people are comfortable on the floor or a medium-firm bed with a small pillow under their head and another under their knees. Some people prefer to lie on their side with a pillow between their knees. Don't stay in one position for too long. Take short walks (10 to 20 minutes) every 2 to 3 hours. Avoid slopes, hills, and stairs until you feel better. Walk only distances you can manage without pain, especially leg pain. How to do the exercises  Pelvic tilt    1. Lie on your back with your knees bent. 2. \"Brace\" your stomach--tighten your muscles by pulling in and imagining your belly button moving toward your spine. 3. Press your lower back into the floor. You should feel your hips and pelvis rock back. 4. Hold for 6 seconds while breathing smoothly. 5. Relax and allow your pelvis and hips to rock forward. 6. Repeat 8 to 12 times. Back stretches    1. Get down on your hands and knees on the floor. 2. Relax your head and allow it to droop. Round your back up toward the ceiling until you feel a nice stretch in your upper, middle, and lower back. Hold this stretch for as long as it feels comfortable, or about 15 to 30 seconds. 3. Return to the starting position with a flat back while you are on your hands and knees. 4. Let your back sway by pressing your stomach toward the floor. Lift your buttocks toward the ceiling. 5. Hold this position for 15 to 30 seconds. 6. Repeat 2 to 4 times. Follow-up care is a key part of your treatment and safety.  Be sure to make and go to all appointments, and call your doctor if you are having problems. It's also a good idea to know your test results and keep a list of the medicines you take. Where can you learn more? Go to http://matthew-romina.info/. Enter T140 in the search box to learn more about \"Low Back Arthritis: Exercises. \"  Current as of: June 26, 2019  Content Version: 12.2  © 9522-3187 [employer]. Care instructions adapted under license by [employer] (which disclaims liability or warranty for this information). If you have questions about a medical condition or this instruction, always ask your healthcare professional. [address] any warranty or liability for your use of this information.

## 2019-12-10 NOTE — PROGRESS NOTES
Rechecked patient's BP manually in left arm, BP= 212/110. Per patient, she did take her BP medicine this morning. Called Dr. Chel Miller office, patient's PCP, spoke with Pritesh He, per Pritesh He patient needs to come into office to be evaluated by Dr. Johanny Schmidt. Patient denies having any altered vision, headache. Informed patient of above, as per Pritesh He. Per patient, she is leaving our office now to head over to Dr. Chel Miller office. Dr. Micha Fox aware. No further action required at this time.

## 2019-12-10 NOTE — LETTER
12/14/19 Patient: Nima Mcmullen YOB: 1945 Date of Visit: 12/10/2019 Shahab Swan MD 
[address] [place] [address] VIA Facsimile: [phone] Dear Shahab Swan MD, Thank you for referring Ms. Ritika Gaston to [facility] for evaluation. My notes for this consultation are attached. If you have questions, please do not hesitate to call me. I look forward to following your patient along with you. Sincerely, Loren Mclain MD

## 2019-12-31 ENCOUNTER — HOSPITAL ENCOUNTER (OUTPATIENT)
Dept: PHYSICAL THERAPY | Age: 74
Discharge: HOME | End: 2019-12-31
Payer: MEDICARE

## 2019-12-31 PROCEDURE — 97112 NEUROMUSCULAR REEDUCATION: CPT

## 2019-12-31 PROCEDURE — 97162 PT EVAL MOD COMPLEX 30 MIN: CPT

## 2019-12-31 PROCEDURE — 97530 THERAPEUTIC ACTIVITIES: CPT

## 2019-12-31 NOTE — PROGRESS NOTES
[facility] [address]  [address], [address] [phone] fax    Plan of Care/ Statement of Necessity for Physical Therapy Services    Patient name: Shree Perez Start of Care: 2019   Referral source: Esau Murrell MD : 1945    Medical Diagnosis: Low back pain [M54.5]  Other abnormalities of gait and mobility [R26.89]  Payor: [place] MEDICARE / Plan: VA MEDICARE PART A & B / Product Type: Medicare /    Onset Date:2019    Treatment Diagnosis: LBP, balance & gait deficits   Prior Hospitalization: see medical history Provider#: [id number]   Medications: Verified on Patient summary List    Comorbidities: HTN, DM, OA   Prior Level of Function: retired. Lives in [address] [place] home. Has RW and SPC         The Plan of Care and following information is based on the information from the initial evaluation. Assessment/ key information: Patient is a 69 y. o.female presenting with Low back pain [M54.5]  Other abnormalities of gait and mobility [R26.89]. Ms. Trores  presents to initial PT evaluation with c/o worsening low back pain, balance, and gait limitations over the past few months She displays hip and core weakness, and noted lumbar flexion bias consistent with degenative spinal changes. Balance testing indicates fall risk. Patient will benefit from skilled PT services to address deficits and facilitate return to premorbid activity level and promote improved quality of life. Evaluation Complexity History MEDIUM  Complexity : 1-2 comorbidities / personal factors will impact the outcome/ POC ; Examination MEDIUM Complexity : 3 Standardized tests and measures addressing body structure, function, activity limitation and / or participation in recreation  ;Presentation MEDIUM Complexity : Evolving with changing characteristics  ; Clinical Decision Making MEDIUM Complexity : FOTO score of 26-74  Overall Complexity Rating: MEDIUM  Problem List: pain affecting function, decrease ROM, decrease strength, edema affecting function, impaired gait/ balance, decrease ADL/ functional abilitiies, decrease activity tolerance, decrease flexibility/ joint mobility and decrease transfer abilities   Treatment Plan may include any combination of the following: Therapeutic exercise, Therapeutic activities, Neuromuscular re-education, Physical agent/modality, Gait/balance training, Manual therapy, Aquatic therapy, Patient education, Self Care training, Functional mobility training, Home safety training and Stair training  Patient / Family readiness to learn indicated by: asking questions, trying to perform skills and interest  Persons(s) to be included in education: patient (P)  Barriers to Learning/Limitations: None  Patient Goal (s): walk again.   Patient Self Reported Health Status: fair  Rehabilitation Potential: fair  Short Term Goals: To be accomplished in 1 weeks:  1. Therapist to establish HEP for ROM & Strengthening to improve ease with gait & ADLs. Long Term Goals: To be accomplished in 4 weeks:  1. Patient will be independent with HEP to improve carryover of functional gains with ADLs between visits. Eval Status:n/a  2. Pt will increase FOTO score to 56 points to demonstrate increased ease with ADLs. Eval Status: FOTO: 44  3. Pt will increase score on Tinetti balance and gait testing to < 24/28 to indicate low fall risk. Eval Status:12/28  4. Patient will increase B hip flexion/ abduction strength to 5/5 with MMT to improve ease with squatting/lifting activities. Eval Status:   right hip flexion: 3/5  right hip abduction: 3/5  left hip flexion: 3/5  Left hip abduction: 3/5    Frequency / Duration: Patient to be seen 2 times per week for 4 weeks.     Patient/ Caregiver education and instruction: Diagnosis, prognosis, self care, activity modification and exercises   [x]  Plan of care has been reviewed with PTA      Certification Period: 12/31/19 - 1/29/20    Kristen Barrios, PT 12/31/2019 3:44 PM    ________________________________________________________________________    I certify that the above Therapy Services are being furnished while the patient is under my care. I agree with the treatment plan and certify that this therapy is necessary.     Physician's Signature:____________Date:_________TIME:________    ** Signature, Date and Time must be completed for valid certification **    Please sign and return to [facility] [address] [facility] [address], [address] [phone] fax

## 2019-12-31 NOTE — PROGRESS NOTES
PT DAILY TREATMENT NOTE - [facility]     Patient Name: Clifford Oar  Date:2019  : 1945  [x]  Patient  Verified  Payor: [place] MEDICARE / Plan: [place] MEDICARE PART A & B / Product Type: Medicare /    In time:300  Out time:340  Total Treatment Time (min): 40  Total Timed Codes (min): 25  1:1 Treatment Time ( [address] only): 40   Visit #: 1 of 8    Treatment Area: Low back pain [M54.5]  Other abnormalities of gait and mobility [R26.89]    SUBJECTIVE  Pain Level (0-10 scale): 3  Any medication changes, allergies to medications, adverse drug reactions, diagnosis change, or new procedure performed?: [x] No    [] Yes (see summary sheet for update)  Subjective functional status:   [x] See Eval form in paper chart      OBJECTIVE    15 min [x]Eval                  []Re-Eval       15 min Therapeutic Activity:  [x]  See flow sheet :HEP, sit to stand training   Rationale: increase ROM, increase strength, improve coordination, improve balance and increase proprioception  to improve the patients ability to move with less pain. 10 min Neuromuscular Re-education:  _  See flow sheet :balance training, trial gait with RW vs SPC   Rationale: increase ROM, increase strength, improve coordination, improve balance and increase proprioception  to improve the patients ability to normalize gait. With   [] TE   [] TA   [] neuro   [] other: Patient Education: [x] Review HEP    [] Progressed/Changed HEP based on:   [] positioning   [] body mechanics   [] transfers   [] heat/ice application    [] other:                  Pain Level (0-10 scale) post treatment: 0    ASSESSMENT:   [x]  See Evaluation         Goals:  Short Term Goals: To be accomplished in 1 weeks:  1. Therapist to establish HEP for ROM & Strengthening to improve ease with gait & ADLs. Long Term Goals: To be accomplished in 4 weeks:  1. Patient will be independent with HEP to improve carryover of functional gains with ADLs between visits.     Eval Status:n/a  2. Pt will increase FOTO score to 56 points to demonstrate increased ease with ADLs. Eval Status: FOTO: 44  3. Pt will increase score on Tinetti balance and gait testing to < 24/28 to indicate low fall risk. Eval Status:12/28  4. Patient will increase B hip flexion/ abduction strength to 5/5 with MMT to improve ease with squatting/lifting activities.     Eval Status:   right hip flexion: 3/5  right hip abduction: 3/5  left hip flexion: 3/5  Left hip abduction: 3/5    PLAN      [x]  Continue plan of care    []  Other:_      Eliel Meeks, PT 12/31/2019  3:50 PM

## 2020-01-08 ENCOUNTER — HOSPITAL ENCOUNTER (OUTPATIENT)
Dept: PHYSICAL THERAPY | Age: 75
Discharge: HOME | End: 2020-01-08
Payer: MEDICARE

## 2020-01-08 PROCEDURE — 97110 THERAPEUTIC EXERCISES: CPT

## 2020-01-08 PROCEDURE — 97530 THERAPEUTIC ACTIVITIES: CPT

## 2020-01-08 NOTE — PROGRESS NOTES
PT DAILY TREATMENT NOTE 10-18    Patient Name: Yolanda Guerra  Date:2020  : 1945  [x]  Patient  Verified  Payor: [place] MEDICARE / Plan: VA MEDICARE PART A & B / Product Type: Medicare /    In time:302  Out time:337  Total Treatment Time (min): 35  Visit #: 2 of 8    Medicare/BCBS Only   Total Timed Codes (min):  35 1:1 Treatment Time:  30       Treatment Area: Low back pain [M54.5]  Other abnormalities of gait and mobility [R26.89]    SUBJECTIVE  Pain Level (0-10 scale): 4  Any medication changes, allergies to medications, adverse drug reactions, diagnosis change, or new procedure performed?: [x] No    [] Yes (see summary sheet for update)  Subjective functional status/changes:   [] No changes reported  \"I am feeling better already, the exercises at home are helping. \"    OBJECTIVE    Modality rationale: Pt declined     15 min Therapeutic Exercise:  [x] See flow sheet :   Rationale: increase ROM and increase strength to improve the patients ability to perform ADLs    20 min Therapeutic Activity:  [x]  See flow sheet : balance, functional hip strength, transfers   Rationale: increase strength, improve coordination and improve balance  to improve the patients ability to transfer    With   [] TE   [] TA   [] neuro   [] other: Patient Education: [x] Review HEP    [] Progressed/Changed HEP based on:   [] positioning   [] body mechanics   [] transfers   [] heat/ice application    [] other:      Other Objective/Functional Measures: initiated exercises per flow sheet     Pain Level (0-10 scale) post treatment: 2    ASSESSMENT/Changes in Function: Pt was able to initiate exercises per flow sheet without increase in symptoms. Pt requiring maximal instruction on sequencing and positioning of FWW in order to increase safety. Pt challenged with eccentric phase of standing exercises, requiring verbal cues for control.      Patient will continue to benefit from skilled PT services to modify and progress therapeutic interventions, address functional mobility deficits, address ROM deficits, address strength deficits, analyze and address soft tissue restrictions, analyze and cue movement patterns, analyze and modify body mechanics/ergonomics, assess and modify postural abnormalities, address imbalance/dizziness and instruct in home and community integration to attain remaining goals. []  See Plan of Care  []  See progress note/recertification  []  See Discharge Summary         Progress towards goals / Updated goals:  Short Term Goals: To be accomplished in 1 weeks:  1. Therapist to establish HEP for ROM & Strengthening to improve ease with gait & ADLs.   MET  Long Term Goals: To be accomplished in 4 weeks:  1. Patient will be independent with HEP to improve carryover of functional gains with ADLs between visits. Eval Status:n/a  2. Pt will increase FOTO score to 56 points to demonstrate increased ease with ADLs. Eval Status: FOTO: 44  3. Pt will increase score on Tinetti balance and gait testing to < 24/28 to indicate low fall risk. Eval Status:12/28  4. Patient will increase B hip flexion/ abduction strength to 5/5 with MMT to improve ease with squatting/lifting activities.                Eval Status:   right hip flexion: 3/5  right hip abduction: 3/5  left hip flexion: 3/5  Left hip abduction: 3/5    PLAN  [x]  Upgrade activities as tolerated     [x]  Continue plan of care  []  Update interventions per flow sheet       []  Discharge due to:_  []  Other:_      Kailyn Carter PT 1/8/2020  3:18 PM    Future Appointments   Date Time Provider Heri Arteaga   1/10/2020  3:00 PM [id number] Kalee Ovalle [facility]   1/14/2020  3:00 [facility]   1/17/2020  3:00 PM Anirudh Slider [facility]   1/22/2020  3:00 PM [facility] Castle[facility]   1/24/2020  3:00 PM [facility]leah[facility]   1/28/2020  3:00 PM [facility] 1/31/2020  3:30 PM Kemi Graves, PT [facility]

## 2020-01-10 ENCOUNTER — HOSPITAL ENCOUNTER (OUTPATIENT)
Dept: PHYSICAL THERAPY | Age: 75
Discharge: HOME | End: 2020-01-10
Payer: MEDICARE

## 2020-01-10 PROCEDURE — 97112 NEUROMUSCULAR REEDUCATION: CPT

## 2020-01-10 PROCEDURE — 97110 THERAPEUTIC EXERCISES: CPT

## 2020-01-10 NOTE — PROGRESS NOTES
PT DAILY TREATMENT NOTE 10-18    Patient Name: Ganesh Pichardo  Date:1/10/2020  : 1945  [x]  Patient  Verified  Payor: VA MEDICARE / Plan: VA MEDICARE PART A & B / Product Type: Medicare /    In time:300  Out time:345  Total Treatment Time (min): 45  Visit #: 3 of 8    Medicare/BCBS Only   Total Timed Codes (min):  45 1:1 Treatment Time:  38       Treatment Area: Low back pain [M54.5]  Other abnormalities of gait and mobility [R26.89]    SUBJECTIVE  Pain Level (0-10 scale): 4  Any medication changes, allergies to medications, adverse drug reactions, diagnosis change, or new procedure performed?: [x] No    [] Yes (see summary sheet for update)  Subjective functional status/changes:   [] No changes reported  \"Last time was a little easy, I like a challenge. \"    OBJECTIVE    22 min Therapeutic Exercise:  [x] See flow sheet :   Rationale: increase ROM and increase strength to improve the patients ability to perform ADL     23 min Neuromuscular Re-education:  [x]  See flow sheet : balance, glut and core virginia   Rationale: increase strength, improve coordination and improve balance  to improve the patients ability to recover LOB, mobility and stability           With   [] TE   [] TA   [] neuro   [] other: Patient Education: [x] Review HEP    [] Progressed/Changed HEP based on:   [] positioning   [] body mechanics   [] transfers   [] heat/ice application    [] other:      Other Objective/Functional Measures: progressed exercises per flow sheet      Pain Level (0-10 scale) post treatment: 3    ASSESSMENT/Changes in Function: Pt tolerated progression of exercises with out increase in symptoms. Pt reporting gluteal and hamstring fatigue after conclusion of exercises; educated pt on delayed onset muscle soreness and expectations over the weekend. Pt verbalized understanding and expressed satisfaction with challenge of exercises today.      Patient will continue to benefit from skilled PT services to modify and progress therapeutic interventions, address functional mobility deficits, address ROM deficits, address strength deficits, analyze and address soft tissue restrictions, analyze and cue movement patterns, analyze and modify body mechanics/ergonomics, assess and modify postural abnormalities, address imbalance/dizziness and instruct in home and community integration to attain remaining goals. []  See Plan of Care  []  See progress note/recertification  []  See Discharge Summary         Progress towards goals / Updated goals:  Short Term Goals: To be accomplished in 1 weeks:  1. Therapist to establish HEP for ROM & Strengthening to improve ease with gait & ADLs.             MET  Long Term Goals: To be accomplished in 4 weeks:  1. Patient will be independent with HEP to improve carryover of functional gains with ADLs between visits.             Eval Status:n/a   Reports compliance  2. Pt will increase FOTO score to 56 points to demonstrate increased ease with ADLs.                Eval Status: FOTO: 44   To be reassessed at 30day gray  3. Pt will increase score on Tinetti balance and gait testing to < 24/28 to indicate low fall risk.               Eval Status:12/28   To be reassessed at 30day gray  4. Patient will increase B hip flexion/ abduction strength to 5/5 with MMT to improve ease with squatting/lifting activities.                Eval Status:   right hip flexion: 3/5  right hip abduction: 3/5  left hip flexion: 3/5   Left hip abduction: 3/5   Tolerating progression of exercises    PLAN  [x]  Upgrade activities as tolerated     [x]  Continue plan of care  []  Update interventions per flow sheet       []  Discharge due to:_  []  Other:_      Caty Gong, PT 1/10/2020  3:46 PM    Future Appointments   Date Time Provider Heri Arteaga   1/14/2020  3:00 PM [facility]   1/17/2020  3:00 PM Huong Hurd, PT [facility]   1/22/2020  3:00 PM Huong Hurd, PT [facility] 1/24/2020  3:00 PM Fernando Silva, PT [facility] [address]   1/28/2020  3:00 PM Juan Hendrickson, PT [facility] [address]   1/31/2020  3:30 PM Fernando Silva, PT [facility] [address]

## 2020-01-14 ENCOUNTER — HOSPITAL ENCOUNTER (OUTPATIENT)
Dept: PHYSICAL THERAPY | Age: 75
Discharge: HOME | End: 2020-01-14
Payer: MEDICARE

## 2020-01-14 PROCEDURE — 97110 THERAPEUTIC EXERCISES: CPT

## 2020-01-14 PROCEDURE — 97112 NEUROMUSCULAR REEDUCATION: CPT

## 2020-01-14 NOTE — PROGRESS NOTES
PT DAILY TREATMENT NOTE 10-18    Patient Name: Cammie Navarrete  Date:2020  : 1945  [x]  Patient  Verified  Payor: [place] MEDICARE / Plan: [place] MEDICARE PART A & B / Product Type: Medicare /    In time:302  Out time:341  Total Treatment Time (min): 39  Visit #: 4 of 8    Medicare/BCBS Only   Total Timed Codes (min):  39 1:1 Treatment Time:  23       Treatment Area: Low back pain [M54.5]  Other abnormalities of gait and mobility [R26.89]    SUBJECTIVE  Pain Level (0-10 scale): 0  Any medication changes, allergies to medications, adverse drug reactions, diagnosis change, or new procedure performed?: [x] No    [] Yes (see summary sheet for update)  Subjective functional status/changes:   [] No changes reported  'I'm not in pain today. \"     OBJECTIVE      25 min Therapeutic Exercise:  [x] See flow sheet :   Rationale: increase ROM, increase strength, improve coordination, improve balance and increase proprioception to improve the patients ability to perform ADLs. 14 min Neuromuscular Re-education:  [x]  See flow sheet :   Rationale: increase ROM, increase strength, improve coordination, improve balance and increase proprioception  to improve the patients ability to decrease fall risk. With   [] TE   [] TA   [] neuro   [] other: Patient Education: [x] Review HEP    [] Progressed/Changed HEP based on:   [] positioning   [] body mechanics   [] transfers   [] heat/ice application    [] other:      Other Objective/Functional Measures:      Pain Level (0-10 scale) post treatment: 4    ASSESSMENT/Changes in Function: progressed LE strengthening and balance activities today. Fatigues with LE strengthening. Leg pain did increase with increase reach.      Patient will continue to benefit from skilled PT services to modify and progress therapeutic interventions, address functional mobility deficits, address ROM deficits, address strength deficits, analyze and address soft tissue restrictions, analyze and cue movement patterns, analyze and modify body mechanics/ergonomics, assess and modify postural abnormalities, address imbalance/dizziness and instruct in home and community integration to attain remaining goals. []  See Plan of Care  []  See progress note/recertification  []  See Discharge Summary         Progress towards goals / Updated goals:  Short Term Goals: To be accomplished in 1 weeks:  1. Therapist to establish HEP for ROM & Strengthening to improve ease with gait & ADLs.             MET  Long Term Goals: To be accomplished in 4 weeks:  1. Patient will be independent with HEP to improve carryover of functional gains with ADLs between visits.             Eval Status:n/a              Reports compliance  2. Pt will increase FOTO score to 56 points to demonstrate increased ease with ADLs.                Eval Status: FOTO: 44              To be reassessed at 30day gray  3. Pt will increase score on Tinetti balance and gait testing to < 24/28 to indicate low fall risk.               Eval Status:12/28              To be reassessed at 30day gray  4. Patient will increase B hip flexion/ abduction strength to 5/5 with MMT to improve ease with squatting/lifting activities.                Eval Status:   right hip flexion: 3/5  right hip abduction: 3/5  left hip flexion: 3/5              Left hip abduction: 3/5              Tolerating progression of exercises       PLAN  []  Upgrade activities as tolerated     []  Continue plan of care  []  Update interventions per flow sheet       []  Discharge due to:_  []  Other:_      Maty Parra, PT 1/14/2020  3:26 PM    Future Appointments   Date Time Provider Heri Arteaga   1/17/2020  3:00 PM Argenis Mark [facility]   1/22/2020  3:00 PM [facility]   1/24/2020  3:00 PM Juan M Dowling, RONI ZACRO [facility]   1/28/2020  3:00 PM Christopher Alvarez PT [facility]   1/31/2020  3:30 PM Juan M Dowling, PT [facility]

## 2020-01-17 ENCOUNTER — HOSPITAL ENCOUNTER (OUTPATIENT)
Dept: PHYSICAL THERAPY | Age: 75
Discharge: HOME | End: 2020-01-17
Payer: MEDICARE

## 2020-01-17 PROCEDURE — 97110 THERAPEUTIC EXERCISES: CPT

## 2020-01-17 PROCEDURE — 97116 GAIT TRAINING THERAPY: CPT

## 2020-01-17 PROCEDURE — 97112 NEUROMUSCULAR REEDUCATION: CPT

## 2020-01-17 NOTE — PROGRESS NOTES
PT DAILY TREATMENT NOTE 10-18    Patient Name: Clifford Oar  Date:2020  : 1945  [x]  Patient  Verified  Payor: [place] MEDICARE / Plan: [place] MEDICARE PART A & B / Product Type: Medicare /    In time:301  Out time:352  Total Treatment Time (min): 51  Visit #: 5 of 8    Medicare/BCBS Only   Total Timed Codes (min):  51 1:1 Treatment Time:  51       Treatment Area: Low back pain [M54.5]  Other abnormalities of gait and mobility [R26.89]    SUBJECTIVE  Pain Level (0-10 scale): 2-3  Any medication changes, allergies to medications, adverse drug reactions, diagnosis change, or new procedure performed?: [x] No    [] Yes (see summary sheet for update)  Subjective functional status/changes:   [] No changes reported  \"I have been doing exercise at home. I am motivated to walk without anything by the last day of February. \"    OBJECTIVE    15 min Therapeutic Exercise:  [] See flow sheet :   Rationale: increase ROM and increase strength to improve the patients ability to perform ADLs    28 min Neuromuscular Re-education:  [x]  See flow sheet : core, glute [place]ia; balance   Rationale: increase strength, improve coordination, improve balance and increase proprioception  to improve the patients mobility, stability, and recovery of LOB    8 min Gait Training:  CGA with 4 pt cane and SPC for 135ft and 100ft, respectively   Rationale: increase activity tolerance and reduce AD in order to normalize gait and reduce risk of falls. With   [] TE   [] TA   [] neuro   [] other: Patient Education: [x] Review HEP    [] Progressed/Changed HEP based on:   [] positioning   [] body mechanics   [] transfers   [] heat/ice application    [] other:      Other Objective/Functional Measures: progressed exercises per flow sheet      Pain Level (0-10 scale) post treatment: 3    ASSESSMENT/Changes in Function: Pt tolerated progression of exercises with out increase in symptoms.  Pt did well with gait training with lesser restrictive device. Pt demonstrating reduced trendelenburg gait when cane placed into right UE vs left. Patient will continue to benefit from skilled PT services to modify and progress therapeutic interventions, address functional mobility deficits, address ROM deficits, address strength deficits, analyze and address soft tissue restrictions, analyze and cue movement patterns, analyze and modify body mechanics/ergonomics, assess and modify postural abnormalities, address imbalance/dizziness and instruct in home and community integration to attain remaining goals. []  See Plan of Care  []  See progress note/recertification  []  See Discharge Summary         Progress towards goals / Updated goals:  Short Term Goals: To be accomplished in 1 weeks:  1. Therapist to establish HEP for ROM & Strengthening to improve ease with gait & ADLs.             MET  Long Term Goals: To be accomplished in 4 weeks:  1. Patient will be independent with HEP to improve carryover of functional gains with ADLs between visits.             Eval Status:n/a              Reports compliance  2. Pt will increase FOTO score to 56 points to demonstrate increased ease with ADLs.                Eval Status: FOTO: 44              To be reassessed at 30day gray  3. Pt will increase score on Tinetti balance and gait testing to < 24/28 to indicate low fall risk.               Eval Status:12/28              To be reassessed at 30day gray  4. Patient will increase B hip flexion/ abduction strength to 5/5 with MMT to improve ease with squatting/lifting activities.                Eval Status:   right hip flexion: 3/5  right hip abduction: 3/5  left hip flexion: 3/5              Left hip abduction: 3/5              Tolerating progression of exercises    PLAN  [x]  Upgrade activities as tolerated     [x]  Continue plan of care  []  Update interventions per flow sheet       []  Discharge due to:_  []  Other:_      Phil Major, PT 1/17/2020  3:53 PM    Future Appointments   Date Time Provider Heri Jenni   1/22/2020  3:00 PM Nevaeh Regalado, PT [facility]   1/24/2020  3:00 PM Antonina Valadez [facility]   1/28/2020  3:00 PM Manuel Alejo, PT [facility]   1/31/2020  3:30 PM [facility]

## 2020-01-22 ENCOUNTER — HOSPITAL ENCOUNTER (OUTPATIENT)
Dept: PHYSICAL THERAPY | Age: 75
Discharge: HOME | End: 2020-01-22
Payer: MEDICARE

## 2020-01-22 PROCEDURE — 97110 THERAPEUTIC EXERCISES: CPT

## 2020-01-22 PROCEDURE — 97530 THERAPEUTIC ACTIVITIES: CPT

## 2020-01-22 NOTE — PROGRESS NOTES
PT DAILY TREATMENT NOTE 10-18    Patient Name: Reshma Krueger  Date:2020  : 1945  [x]  Patient  Verified  Payor: [place] MEDICARE / Plan: [place] MEDICARE PART A & B / Product Type: Medicare /    In time:303  Out time:345  Total Treatment Time (min): 42  Visit #: 6 of 8    Medicare/BCBS Only   Total Timed Codes (min):  42 1:1 Treatment Time:  38       Treatment Area: Low back pain [M54.5]  Other abnormalities of gait and mobility [R26.89]    SUBJECTIVE  Pain Level (0-10 scale): 3  Any medication changes, allergies to medications, adverse drug reactions, diagnosis change, or new procedure performed?: [x] No    [] Yes (see summary sheet for update)  Subjective functional status/changes:   [] No changes reported  Pt reports at home over the weekend she was able to walk from the front to the back of the house without any AD.  She finds it hurts if she puts her left toe on the floor, but if she only puts down her heel it doesn't hurt the back of her legs    OBJECTIVE    17 min Therapeutic Exercise:  [x] See flow sheet :   Rationale: increase ROM and increase strength to improve the patients ability to perform ADLs    25 min Therapeutic Activity:  [x]  See flow sheet : including gait training with SPC   Rationale: increase strength, improve coordination, improve balance and increase proprioception  to improve the patients ability to negotiate the community            With   [] TE   [] TA   [] neuro   [] other: Patient Education: [x] Review HEP    [] Progressed/Changed HEP based on:   [] positioning   [] body mechanics   [] transfers   [] heat/ice application    [] other:      Other Objective/Functional Measures: SPC gait CGA for 3:30 before required sitting rest break     Pain Level (0-10 scale) post treatment: 2    ASSESSMENT/Changes in Function: Pt reporting symptoms into posterior legs with limited extension creased during ambulation; symptoms reduced with performing PPT but pt unable to maintain during ambulation due to fatigue and weakness. Pt demonstrating generalized fatigue with exercises today, requiring increased cuing for proper technique. Patient will continue to benefit from skilled PT services to modify and progress therapeutic interventions, address functional mobility deficits, address ROM deficits, address strength deficits, analyze and address soft tissue restrictions, analyze and cue movement patterns, analyze and modify body mechanics/ergonomics, assess and modify postural abnormalities, address imbalance/dizziness and instruct in home and community integration to attain remaining goals. []  See Plan of Care  []  See progress note/recertification  []  See Discharge Summary         Progress towards goals / Updated goals:  Short Term Goals: To be accomplished in 1 weeks:  1. Therapist to establish HEP for ROM & Strengthening to improve ease with gait & ADLs.             MET  Long Term Goals: To be accomplished in 4 weeks:  1. Patient will be independent with HEP to improve carryover of functional gains with ADLs between visits.             Eval Status:n/a              Reports compliance  2. Pt will increase FOTO score to 56 points to demonstrate increased ease with ADLs.                Eval Status: FOTO: 44              To be reassessed at 30day gray  3. Pt will increase score on Tinetti balance and gait testing to < 24/28 to indicate low fall risk.               Eval Status:12/28              Gait improving, training with SPC  4. Patient will increase B hip flexion/ abduction strength to 5/5 with MMT to improve ease with squatting/lifting activities.                Eval Status:   right hip flexion: 3/5  right hip abduction: 3/5  left hip flexion: 3/5              Left hip abduction: 3/5              Tolerating progression of exercises    PLAN  [x]  Upgrade activities as tolerated     [x]  Continue plan of care  []  Update interventions per flow sheet       []  Discharge due to:_  [] Other:_      Kvng Leaignacio, PT 1/22/2020  3:43 PM    Future Appointments   Date Time Provider Heri Arteaga   1/24/2020  3:00 PM Kwesi Morris, PT [facility]   1/28/2020  3:00 PM [facility]   1/31/2020  3:30 PM Kwesi Morris, PT [facility]

## 2020-01-24 ENCOUNTER — HOSPITAL ENCOUNTER (OUTPATIENT)
Dept: PHYSICAL THERAPY | Age: 75
Discharge: HOME | End: 2020-01-24
Payer: MEDICARE

## 2020-01-24 PROCEDURE — 97110 THERAPEUTIC EXERCISES: CPT

## 2020-01-24 PROCEDURE — 97112 NEUROMUSCULAR REEDUCATION: CPT

## 2020-01-24 NOTE — PROGRESS NOTES
PT DAILY TREATMENT NOTE 10-18    Patient Name: Richard Delay  Date:2020  : 1945  [x]  Patient  Verified  Payor: VA MEDICARE / Plan: [place] MEDICARE PART A & B / Product Type: Medicare /    In time:303  Out time:333  Total Treatment Time (min): 30  Visit #: 6 of 8    Medicare/BCBS Only   Total Timed Codes (min):  30 1:1 Treatment Time:  26       Treatment Area: Low back pain [M54.5]  Other abnormalities of gait and mobility [R26.89]    SUBJECTIVE  Pain Level (0-10 scale): 1  Any medication changes, allergies to medications, adverse drug reactions, diagnosis change, or new procedure performed?: [x] No    [] Yes (see summary sheet for update)  Subjective functional status/changes:   [] No changes reported  \"I don't feel great today\"    OBJECTIVE    20 min Therapeutic Exercise:  [x] See flow sheet :   Rationale: increase ROM and increase strength to improve the patients ability to perform ADLs     10 min Neuromuscular Re-education:  [x]  See flow sheet : balance   Rationale: improve coordination and improve balance  to improve the patients ability to correct LOB          With   [] TE   [] TA   [] neuro   [] other: Patient Education: [x] Review HEP    [] Progressed/Changed HEP based on:   [] positioning   [] body mechanics   [] transfers   [] heat/ice application    [] other:      Other Objective/Functional Measures: performed adjusted program per flow sheet    Pain Level (0-10 scale) post treatment: 2    ASSESSMENT/Changes in Function: Deferred standing exercises due to pt fatigue levels and concern for safety. Pt requiring cuing for breathing to prevent valsalva and to decrease use of momentum on exercises. Pt educated on balance strategies due to use of uE reaching as primary strategy.     Patient will continue to benefit from skilled PT services to modify and progress therapeutic interventions, address functional mobility deficits, address ROM deficits, address strength deficits, analyze and address soft tissue restrictions, analyze and cue movement patterns, analyze and modify body mechanics/ergonomics, assess and modify postural abnormalities, address imbalance/dizziness and instruct in home and community integration to attain remaining goals. []  See Plan of Care  []  See progress note/recertification  []  See Discharge Summary         Progress towards goals / Updated goals:  Short Term Goals: To be accomplished in 1 weeks:  1. Therapist to establish HEP for ROM & Strengthening to improve ease with gait & ADLs.             MET  Long Term Goals: To be accomplished in 4 weeks:  1. Patient will be independent with HEP to improve carryover of functional gains with ADLs between visits.             Eval Status:n/a              Reports compliance  2. Pt will increase FOTO score to 56 points to demonstrate increased ease with ADLs.                Eval Status: FOTO: 44              To be reassessed at 30day gray  3. Pt will increase score on Tinetti balance and gait testing to < 24/28 to indicate low fall risk.               Eval Status:12/28              Gait improving, training with SPC  4. Patient will increase B hip flexion/ abduction strength to 5/5 with MMT to improve ease with squatting/lifting activities.                Eval Status:   right hip flexion: 3/5  right hip abduction: 3/5  left hip flexion: 3/5              Left hip abduction: 3/5              Progressing    PLAN  [x]  Upgrade activities as tolerated     [x]  Continue plan of care  []  Update interventions per flow sheet       []  Discharge due to:_  []  Other:_      Ryland Pulido, PT 1/24/2020  3:14 PM    Future Appointments   Date Time Provider Hrei Arteaga   1/28/2020  3:00 PM Jigar Tilley [facility]   1/31/2020  3:30 PM Elbert Alcantar, PT [facility]

## 2020-01-28 ENCOUNTER — HOSPITAL ENCOUNTER (OUTPATIENT)
Dept: PHYSICAL THERAPY | Age: 75
Discharge: HOME | End: 2020-01-28
Payer: MEDICARE

## 2020-01-28 PROCEDURE — 97112 NEUROMUSCULAR REEDUCATION: CPT

## 2020-01-28 PROCEDURE — 97110 THERAPEUTIC EXERCISES: CPT

## 2020-01-28 NOTE — PROGRESS NOTES
[facility] [address], [address] [phone] fax    Continued Plan of Care/ Re-certification for Physical Therapy Services    Patient name: Brianda Kendrick Start of Care: 2019   Referral source: Joanna Rausch MD : 1945               Medical Diagnosis: Low back pain [M54.5]  Other abnormalities of gait and mobility [R26.89]  Payor: VA MEDICARE / Plan: VA MEDICARE PART A & B / Product Type: Medicare /     Onset Date:2019               Treatment Diagnosis: LBP, balance & gait deficits   Prior Hospitalization: see medical history Provider#: [id number]   Medications: Verified on Patient summary List    Comorbidities: HTN, DM, OA   Prior Level of Function: retired. Lives in [address]. Has RW and SPC    Visits from Start of Care: 8    Missed Visits: 0    The Plan of Care and following information is based on the patient's current status:  Short Term Goals: To be accomplished in 1 weeks:  1. Therapist to establish HEP for ROM & Strengthening to improve ease with gait & ADLs.             MET  Long Term Goals: To be accomplished in 4 weeks:  1. Patient will be independent with HEP to improve carryover of functional gains with ADLs between visits.             Eval Status:n/a              MET: Reports compliance  2. Pt will increase FOTO score to 56 points to demonstrate increased ease with ADLs.                Eval Status: FOTO: 44              PROGRESSIN  3. Pt will increase score on Tinetti balance and gait testing to < 24/28 to indicate low fall risk.               Eval Status:              PROGRESSIN/28  4. Patient will increase B hip flexion/ abduction strength to 5/5 with MMT to improve ease with squatting/lifting activities.                Eval Status:   right hip flexion: 3/5  right hip abduction: 3/5  left hip flexion: 3/5              Left hip abduction: 3/5  PROGRESSING:   right hip flexion: 4/5  right hip abduction: 3/5  left hip flexion: 4/5              Left hip abduction: 3/5    Key functional changes:   Functional Gains: walking, general activity tolerance, basic balance, ADLs  Functional Deficits: stairs, lifting and carrying groceries, picking objects off floor, unstable surfaces  % improvement: 50%  Pain   Average: 2-3/10       Best: 0/10     Worst: 3/10  Patient Goal: \"I want to work on walking better with a cane. \"    Problems/ barriers to goal attainment: weakness, impaired LE flexibility    Problem List: pain affecting function, decrease ROM, decrease strength, edema affecting function, impaired gait/ balance, decrease ADL/ functional abilitiies, decrease activity tolerance, decrease flexibility/ joint mobility and decrease transfer abilities    Treatment Plan: Therapeutic exercise, Therapeutic activities, Neuromuscular re-education, Physical agent/modality, Gait/balance training, Manual therapy, Aquatic therapy, Patient education, Self Care training, Functional mobility training, Home safety training and Stair training       Goals for this certification period to be accomplished in 4 weeks: 1. Pt will increase FOTO score to 56 points to demonstrate increased ease with ADLs.    PN[de-identified] 42  2. Pt will increase score on Tinetti balance and gait testing to < 24/28 to indicate low fall risk. PN[de-identified] 22/28  3. Patient will increase B hip flexion/ abduction strength to 5/5 with MMT to improve ease with squatting/lifting activities. PN:  right hip flexion: 4/5  right hip abduction: 3/5  left hip flexion: 4/5              Left hip abduction: 3/5    Frequency / Duration: Patient to be seen 2 times per week for 4 weeks:    Assessment / Recommendations:Ms. Jocelin Correia has made great progress with LE strength, pain control, and improved balance. She is still at some risk for falls as compared to high risk for falls at initial evaluation. Recommend continued PT to address remaining limitations.      Certification Period: 1/31/2020 - 2/29/2020    Katherin Salazar, PT 1/28/2020 3:05 PM    ________________________________________________________________________  I certify that the above Therapy Services are being furnished while the patient is under my care. I agree with the treatment plan and certify that this therapy is necessary. [] I have read the above and request that my patient continue as recommended.   [] I have read the above report and request that my patient continue therapy with the following changes/special instructions: _____________________________________________  [] I have read the above report and request that my patient be discharged from therapy    Physician's Signature:____________Date:_________TIME:________    ** Signature, Date and Time must be completed for valid certification **    Please sign and return to [facility] [place] [id number]  [address] [facility] [address], [address]  [phone] fax

## 2020-01-28 NOTE — PROGRESS NOTES
PT DAILY TREATMENT NOTE 10-18    Patient Name: Josue Lorenzo  Date:2020  : 1945  [x]  Patient  Verified  Payor: Shelbie Sommers / Plan: [place] MEDICARE PART A & B / Product Type: Medicare /    In time:255  Out time:335  Total Treatment Time (min): 40  Visit #: 8 of 8    Medicare/BCBS Only   Total Timed Codes (min):  40 1:1 Treatment Time:  40       Treatment Area: Low back pain [M54.5]  Other abnormalities of gait and mobility [R26.89]    SUBJECTIVE  Pain Level (0-10 scale): 2  Any medication changes, allergies to medications, adverse drug reactions, diagnosis change, or new procedure performed?: [x] No    [] Yes (see summary sheet for update)  Subjective functional status/changes:   [] No changes reported  \"I'm doing better. \"    OBJECTIVE         15 min Therapeutic Exercise:  [x]? See flow sheet :   Rationale: increase ROM and increase strength to improve the patients ability to perform ADLs     25 min Neuromuscular Re-education:  [x]?   See flow sheet : balance   Rationale: improve coordination and improve balance  to improve the patients ability to correct LOB                                                                      With   [] TE   [] TA   [] neuro   [] other: Patient Education: [x] Review HEP    [] Progressed/Changed HEP based on:   [] positioning   [] body mechanics   [] transfers   [] heat/ice application    [] other:      Other Objective/Functional Measures:      Pain Level (0-10 scale) post treatment: 2    ASSESSMENT/Changes in Function: see recert    Patient will continue to benefit from skilled PT services to modify and progress therapeutic interventions, address functional mobility deficits, address ROM deficits, address strength deficits, analyze and address soft tissue restrictions, analyze and cue movement patterns, analyze and modify body mechanics/ergonomics, assess and modify postural abnormalities, address imbalance/dizziness and instruct in home and community integration to attain remaining goals. []  See Plan of Care  [x]  See progress note/recertification  []  See Discharge Summary    Goals for this certification period to be accomplished in 4 weeks: 1. Pt will increase FOTO score to 56 points to demonstrate increased ease with ADLs.    PN[de-identified] 42  2. Pt will increase score on Tinetti balance and gait testing to < 24/28 to indicate low fall risk. PN[de-identified] 22/28  3. Patient will increase B hip flexion/ abduction strength to 5/5 with MMT to improve ease with squatting/lifting activities.    PN:  right hip flexion: 4/5  right hip abduction: 3/5  left hip flexion: 4/5              Left hip abduction: 3/5    PLAN  []  Upgrade activities as tolerated     [x]  Continue plan of care  []  Update interventions per flow sheet       []  Discharge due to:_  []  Other:_      Jazmine Nix, PT 1/28/2020  4:24 PM    Future Appointments   Date Time Provider Heri Arteaga   1/31/2020  3:30 PM Velvet Ravel [facility]   2/5/2020  3:30 PM Velvet Ravel [facility]   2/7/2020  3:00 PM Velvet Ravel [facility]   2/12/2020  3:00 PM Elbert Alcantar, PT [facility]   2/14/2020  3:00 PM Velvet Ravel [facility]   2/19/2020  3:00 PM Elbert Alcantar, PT [facility]   2/21/2020  3:00 PM Elbert Alcantar, PT [facility]   2/26/2020  3:00 PM Elbert Alcantar, PT [facility]   2/28/2020  3:00 PM Elbert Alcantar, PT [facility]

## 2020-01-31 ENCOUNTER — APPOINTMENT (OUTPATIENT)
Dept: PHYSICAL THERAPY | Age: 75
End: 2020-01-31
Payer: MEDICARE

## 2020-02-05 ENCOUNTER — HOSPITAL ENCOUNTER (OUTPATIENT)
Dept: PHYSICAL THERAPY | Age: 75
Discharge: HOME | End: 2020-02-05
Payer: MEDICARE

## 2020-02-05 PROCEDURE — 97110 THERAPEUTIC EXERCISES: CPT

## 2020-02-05 PROCEDURE — 97112 NEUROMUSCULAR REEDUCATION: CPT

## 2020-02-05 PROCEDURE — 97530 THERAPEUTIC ACTIVITIES: CPT

## 2020-02-05 NOTE — PROGRESS NOTES
PT DAILY TREATMENT NOTE 10-18    Patient Name: Fernando Jacob  Date:2020  : 1945  [x]  Patient  Verified  Payor: VA MEDICARE / Plan: VA MEDICARE PART A & B / Product Type: Medicare /    In time:317  Out time:410  Total Treatment Time (min): 53  Visit #: 1 of 8    Medicare/BCBS Only   Total Timed Codes (min):  53 1:1 Treatment Time:  42       Treatment Area: Low back pain [M54.5]  Other abnormalities of gait and mobility [R26.89]    SUBJECTIVE  Pain Level (0-10 scale): 3  Any medication changes, allergies to medications, adverse drug reactions, diagnosis change, or new procedure performed?: [x] No    [] Yes (see summary sheet for update)  Subjective functional status/changes:   [] No changes reported  Pt reports she has low energy levels today because she is still getting over being sick. She has been using the [facility] but did use her FWW when she wasn't feeling well. OBJECTIVE    13 min Therapeutic Exercise:  [x] See flow sheet :   Rationale: increase ROM and increase strength to improve the patients ability to perform ADLs    25 min Therapeutic Activity:  [x]  See flow sheet :   Rationale: increase strength and improve coordination  to improve the patients ability to negotiate the community     15 min Neuromuscular Re-education:  [x]  See flow sheet :   Rationale: improve coordination, improve balance and increase proprioception  to improve the patients ability to recover LOB       With   [] TE   [] TA   [] neuro   [] other: Patient Education: [x] Review HEP    [] Progressed/Changed HEP based on:   [] positioning   [] body mechanics   [] transfers   [] heat/ice application    [] other:      Other Objective/Functional Measures: progressed exercises per flow sheet      Pain Level (0-10 scale) post treatment: 3    ASSESSMENT/Changes in Function: Pt tolerated progression of exercises with out increase in symptoms.  Pt requiring cuing for eccentric control when returning from elevated stance to double limb stance due to challenges with single leg stance. Pt requiring encouragement for balance exercises as pt has fear of falling and defers to use of UE reaching. As pt confidence increasing pt demonstrated improved use of ankle/hip and stepping strategies. Patient will continue to benefit from skilled PT services to modify and progress therapeutic interventions, address functional mobility deficits, address ROM deficits, address strength deficits, analyze and address soft tissue restrictions, analyze and cue movement patterns, analyze and modify body mechanics/ergonomics, assess and modify postural abnormalities, address imbalance/dizziness and instruct in home and community integration to attain remaining goals. []  See Plan of Care  []  See progress note/recertification  []  See Discharge Summary         Progress towards goals / Updated goals: 1. Pt will increase FOTO score to 56 points to demonstrate increased ease with ADLs.     PN[de-identified] 42   To be reassessed at [address]  2. Pt will increase score on Tinetti balance and gait testing to < 24/28 to indicate low fall risk.    PN[de-identified] 22/28   To be reassessed at 30day gray  3. Patient will increase B hip flexion/ abduction strength to 5/5 with MMT to improve ease with squatting/lifting activities.   PN:  right hip flexion: 4/5  right hip abduction: 3/5  left hip flexion: 4/5              Left hip abduction: 3/5   Tolerating progression of exercises.      PLAN  [x]  Upgrade activities as tolerated     [x]  Continue plan of care  []  Update interventions per flow sheet       []  Discharge due to:_  []  Other:_      Evaristo Hermosillo PT 2/5/2020  3:20 PM    Future Appointments   Date Time Provider Heri Arteaga   2/5/2020  3:30 PM Moira Munoz [facility]   2/7/2020  3:00 PM [facility]   2/12/2020  3:00 PM Roshankesiha Elizabeth, [facility]   2/14/2020  3:00 PM [facility]oshankeshia Elizabeth, [facility]   2/19/2020  3:00 PM Roshankeshia Elizabeth, PT [facility]   2/21/2020  3:00 PM Kameron Knight, PT [facility]   2/26/2020  3:00 PM Kameron Knight, PT [facility]   2/28/2020  3:00 PM Kameron Knight, PT [facility]

## 2020-02-07 ENCOUNTER — HOSPITAL ENCOUNTER (OUTPATIENT)
Dept: PHYSICAL THERAPY | Age: 75
Discharge: HOME | End: 2020-02-07
Payer: MEDICARE

## 2020-02-07 PROCEDURE — 97112 NEUROMUSCULAR REEDUCATION: CPT

## 2020-02-07 PROCEDURE — 97110 THERAPEUTIC EXERCISES: CPT

## 2020-02-07 NOTE — PROGRESS NOTES
PT DAILY TREATMENT NOTE 10-18    Patient Name: Hazel Estimable  Date:2020  : 1945  [x]  Patient  Verified  Payor: [place] MEDICARE / Plan: [place] MEDICARE PART A & B / Product Type: Medicare /    In time:255  Out time:350  Total Treatment Time (min): 55  Visit #: 2 of 8    Medicare/BCBS Only   Total Timed Codes (min):  55 1:1 Treatment Time:  55       Treatment Area: Low back pain [M54.5]  Other abnormalities of gait and mobility [R26.89]    SUBJECTIVE  Pain Level (0-10 scale): 1  Any medication changes, allergies to medications, adverse drug reactions, diagnosis change, or new procedure performed?: [x] No    [] Yes (see summary sheet for update)  Subjective functional status/changes:   [] No changes reported  \"Today is a good day. I am feeling good. I have been trying to walk heel toe like you taught me and it has allowed me to walk better when I am walking alone. \"    OBJECTIVE    15 min Therapeutic Exercise:  [x] See flow sheet :   Rationale: increase ROM and increase strength to improve the patients ability to perform ADLs    40 min Neuromuscular Re-education:  [x]  See flow sheet : balance, hip virginia   Rationale: increase strength, improve coordination, improve balance and increase proprioception  to improve the patients ability to negotiate the community and correct LOB        With   [] TE   [] TA   [] neuro   [] other: Patient Education: [x] Review HEP    [] Progressed/Changed HEP based on:   [] positioning   [] body mechanics   [] transfers   [] heat/ice application    [] other:      Other Objective/Functional Measures: CGA for all balance activities     Pain Level (0-10 scale) post treatment: 1    ASSESSMENT/Changes in Function: Pt was very challenged with stepping over 4in targets in left single leg stance due to hip abductor weakness; pt demonstrating improvement after neuro virginia activities with weight shifting in the parallel bars, but remained challenged.  Added supine hip abduction with LE extended for neuro virginia; pt reporting improved awareness. Educated pt on possible delayed onset muscle soreness and stretching to address if she experiences it . Patient will continue to benefit from skilled PT services to modify and progress therapeutic interventions, address functional mobility deficits, address ROM deficits, address strength deficits, analyze and address soft tissue restrictions, analyze and cue movement patterns, analyze and modify body mechanics/ergonomics, assess and modify postural abnormalities, address imbalance/dizziness and instruct in home and community integration to attain remaining goals. []  See Plan of Care  []  See progress note/recertification  []  See Discharge Summary         Progress towards goals / Updated goals: 1. Pt will increase FOTO score to 56 points to demonstrate increased ease with ADLs.                PN[de-identified] 42              To be reassessed at [address]  2. Pt will increase score on Tinetti balance and gait testing to < 24/28 to indicate low fall risk.               PN[de-identified] 22/28              To be reassessed at 30day gray  3.  Patient will increase B hip flexion/ abduction strength to 5/5 with MMT to improve ease with squatting/lifting activities.   PN:  right hip flexion: 4/5  right hip abduction: 3/5  left hip flexion: 4/5              Left hip abduction: 3/5              Tolerating progression of exercises; continued left hip abductor weakness       PLAN  [x]  Upgrade activities as tolerated     [x]  Continue plan of care  []  Update interventions per flow sheet       []  Discharge due to:_  []  Other:_      Mikhail Waller, PT 2/7/2020  2:58 PM    Future Appointments   Date Time Provider Heri Arteaga   2/7/2020  3:00 PM Juan M Dowling PT [facility] [address]   2/12/2020  3:00 PM Juan M Dowling PT [facility] [address]   2/14/2020  3:00 PM Argenis Mark [address]   2/19/2020  3:00 PM Juan M Dowling PT [facility] [address]   2/21/2020  3:00 PM Juan M Dowling, PT [facility]   2/26/2020  3:00 PM [facility]   2/28/2020  3:00 PM Leopold Camera, PT [facility]

## 2020-02-12 ENCOUNTER — HOSPITAL ENCOUNTER (OUTPATIENT)
Dept: PHYSICAL THERAPY | Age: 75
Discharge: HOME | End: 2020-02-12
Payer: MEDICARE

## 2020-02-12 PROCEDURE — 97140 MANUAL THERAPY 1/> REGIONS: CPT

## 2020-02-12 PROCEDURE — 97110 THERAPEUTIC EXERCISES: CPT

## 2020-02-12 NOTE — PROGRESS NOTES
PT DAILY TREATMENT NOTE 10-18    Patient Name: Josue Lorenzo  Date:2020  : 1945  [x]  Patient  Verified  Payor: [place] MEDICARE / Plan: [place] MEDICARE PART A & B / Product Type: Medicare /    In time:300  Out time:355  Total Treatment Time (min): 55  Visit #: 3 of 8    Medicare/BCBS Only   Total Timed Codes (min):  45 1:1 Treatment Time:  24       Treatment Area: Low back pain [M54.5]  Other abnormalities of gait and mobility [R26.89]    SUBJECTIVE  Pain Level (0-10 scale): 3  Any medication changes, allergies to medications, adverse drug reactions, diagnosis change, or new procedure performed?: [x] No    [] Yes (see summary sheet for update)  Subjective functional status/changes:   [] No changes reported  Pt reports she was very sore in her hips over the weekend. She rested on Saturday and by  it was improving.  She had increased pain in the back of her legs today, which improved with exercise     OBJECTIVE    Modality rationale: decrease pain and increase tissue extensibility to improve the patients ability to tolerate sustained postures   Min Type Additional Details    [] Estim:  []Unatt       []IFC  []Premod                        []Other:  []w/ice   []w/heat  Position:  Location:    [] Estim: []Att    []TENS instruct  []NMES                    []Other:  []w/US   []w/ice   []w/heat  Position:  Location:    []  Traction: [] Cervical       []Lumbar                       [] Prone          []Supine                       []Intermittent   []Continuous Lbs:  [] before manual  [] after manual    []  Ultrasound: []Continuous   [] Pulsed                           []1MHz   []3MHz W/cm2:  Location:    []  Iontophoresis with dexamethasone         Location: [] Take home patch   [] In clinic   10 []  Ice     [x]  heat  []  Ice massage  []  Laser   []  Anodyne Position: seated  Location: bilateral hips    []  Laser with stim  []  Other:  Position:  Location:    []  Vasopneumatic Device Pressure:       [] lo [] med [] hi   Temperature: [] lo [] med [] hi   [] Skin assessment post-treatment:  [x]intact []redness- no adverse reaction    []redness  adverse reaction:     35 min Therapeutic Exercise:  [] See flow sheet :   Rationale: increase ROM and increase strength to improve the patients ability to perform ADLs    10 min Manual Therapy:  STM to bilateral gluteus medius in order to reduce pain    Pt consented to all manual therapy    Rationale: decrease pain and increase tissue extensibility to tolerate sustained postures    With   [] TE   [] TA   [] neuro   [] other: Patient Education: [x] Review HEP    [] Progressed/Changed HEP based on:   [] positioning   [] body mechanics   [] transfers   [] heat/ice application    [] other:      Other Objective/Functional Measures: TTP bilateral glut med left > right      Pain Level (0-10 scale) post treatment: 1    ASSESSMENT/Changes in Function: Held standing exercises due to pt c/o pain today. Performed manual to reduce complaints of pain; pt reducing to 1/10 after manual and modalities. Will resume balance and standing activiteis as pt tolerates next visit. Patient will continue to benefit from skilled PT services to modify and progress therapeutic interventions, address functional mobility deficits, address ROM deficits, address strength deficits, analyze and address soft tissue restrictions, analyze and cue movement patterns, analyze and modify body mechanics/ergonomics, assess and modify postural abnormalities, address imbalance/dizziness and instruct in home and community integration to attain remaining goals. []  See Plan of Care  []  See progress note/recertification  []  See Discharge Summary         Progress towards goals / Updated goals: 1. Pt will increase FOTO score to 56 points to demonstrate increased ease with ADLs.                PN[de-identified] 42              CY be reassessed at 30day gray  2.  Pt will increase score on Tinetti balance and gait testing to < 24/28 to indicate low fall risk.               PN[de-identified] 22/28              To be reassessed at 30day gray  3.  Patient will increase B hip flexion/ abduction strength to 5/5 with MMT to improve ease with squatting/lifting activities.   PN:  right hip flexion: 4/5  right hip abduction: 3/5  left hip flexion: 4/5              Left hip abduction: 3/5              Tolerating progression of exercises; continued left hip abductor weakness    PLAN  [x]  Upgrade activities as tolerated     [x]  Continue plan of care  []  Update interventions per flow sheet       []  Discharge due to:_  []  Other:_      Johnny Lilly, PT 2/12/2020  4:00 PM    Future Appointments   Date Time Provider Heri Arteaga   2/14/2020  3:00 PM Tracie Hill [address] [facility]   2/19/2020  3:00 PM Esdras Hair, PT [facility]   2/21/2020  3:00 PM Esdras Hair, PT [facility]   2/26/2020  3:00 PM Esdras Hair, PT [facility]   2/28/2020  3:00 PM Sivakumar Tamayo [facility]

## 2020-02-14 ENCOUNTER — HOSPITAL ENCOUNTER (OUTPATIENT)
Dept: PHYSICAL THERAPY | Age: 75
Discharge: HOME | End: 2020-02-14
Payer: MEDICARE

## 2020-02-14 PROCEDURE — 97110 THERAPEUTIC EXERCISES: CPT

## 2020-02-14 PROCEDURE — 97112 NEUROMUSCULAR REEDUCATION: CPT

## 2020-02-14 NOTE — PROGRESS NOTES
PT DAILY TREATMENT NOTE 10-18    Patient Name: Alyson Christianson  Date:2020  : 1945  [x]  Patient  Verified  Payor: [place] MEDICARE / Plan: [place] MEDICARE PART A & B / Product Type: Medicare /    In time:300  Out time:344  Total Treatment Time (min): 44  Visit #: 4 of 8    Medicare/BCBS Only   Total Timed Codes (min):  44 1:1 Treatment Time:  28       Treatment Area: Low back pain [M54.5]  Other abnormalities of gait and mobility [R26.89]    SUBJECTIVE  Pain Level (0-10 scale): 1  Any medication changes, allergies to medications, adverse drug reactions, diagnosis change, or new procedure performed?: [x] No    [] Yes (see summary sheet for update)  Subjective functional status/changes:   [] No changes reported  Pt reports she has been feeling better since the manual on Wednesday; she has also been heating at home which is helpful. \"I feel the best I have in while today. \"    OBJECTIVE    29 min Therapeutic Exercise:  [x] See flow sheet :   Rationale: increase ROM and increase strength to improve the patients ability to ambulate without AD    15 min Neuromuscular Re-education:  [x]  See flow sheet : balance, glut virginia   Rationale: increase strength, improve coordination and improve balance  to improve the patients ability to recover LOB    With   [] TE   [] TA   [] neuro   [] other: Patient Education: [x] Review HEP    [] Progressed/Changed HEP based on:   [] positioning   [] body mechanics   [] transfers   [] heat/ice application    [] other:      Other Objective/Functional Measures: SBA for NBOS balance; CGA for 1/2 stance on left LE     Pain Level (0-10 scale) post treatment: 0    ASSESSMENT/Changes in Function: Pt improving recruitment of glute med in sidelying and in standing; continues to require assistance in sidelying exercises due to weakness and substitutions. Pt able to perform balance with EC without CGA today. Balance progressing well.      Patient will continue to benefit from skilled PT services to modify and progress therapeutic interventions, address functional mobility deficits, address ROM deficits, address strength deficits, analyze and address soft tissue restrictions, analyze and cue movement patterns, analyze and modify body mechanics/ergonomics, assess and modify postural abnormalities, address imbalance/dizziness and instruct in home and community integration to attain remaining goals. []  See Plan of Care  []  See progress note/recertification  []  See Discharge Summary         Progress towards goals / Updated goals: 1. Pt will increase FOTO score to 56 points to demonstrate increased ease with ADLs.                PN[de-identified] 42              UN be reassessed at 30day gray  2. Pt will increase score on Tinetti balance and gait testing to < 24/28 to indicate low fall risk.               PN[de-identified] 22/28              To be reassessed at 30day gray  3.  Patient will increase B hip flexion/ abduction strength to 5/5 with MMT to improve ease with squatting/lifting activities.   PN:  right hip flexion: 4/5  right hip abduction: 3/5  left hip flexion: 4/5              Left hip abduction: 3/5              Tolerating progression of exercises; continued left hip abductor weakness    PLAN  [x]  Upgrade activities as tolerated     [x]  Continue plan of care  []  Update interventions per flow sheet       []  Discharge due to:_  []  Other:_      Adalberto Matos PT 2/14/2020  4:06 PM    Future Appointments   Date Time Provider Heri Arteaga   2/19/2020  3:00 PM [facility]   2/21/2020  3:00 PM [facility]   2/26/2020  3:00 PM [facility]   2/28/2020  3:00 PM Marine Steele [facility]

## 2020-02-19 ENCOUNTER — HOSPITAL ENCOUNTER (OUTPATIENT)
Dept: PHYSICAL THERAPY | Age: 75
Discharge: HOME | End: 2020-02-19
Payer: MEDICARE

## 2020-02-19 PROCEDURE — 97112 NEUROMUSCULAR REEDUCATION: CPT

## 2020-02-19 PROCEDURE — 97110 THERAPEUTIC EXERCISES: CPT

## 2020-02-19 NOTE — PROGRESS NOTES
PT DAILY TREATMENT NOTE 10-18    Patient Name: Willie Villarreal  Date:2020  : 1945  [x]  Patient  Verified  Payor: [place] MEDICARE / Plan: [place] MEDICARE PART A & B / Product Type: Medicare /    In time:259  Out time:340  Total Treatment Time (min): 41  Visit #: 5 of 8    Medicare/BCBS Only   Total Timed Codes (min):  41 1:1 Treatment Time:  32       Treatment Area: Low back pain [M54.5]  Other abnormalities of gait and mobility [R26.89]    SUBJECTIVE  Pain Level (0-10 scale): 3  Any medication changes, allergies to medications, adverse drug reactions, diagnosis change, or new procedure performed?: [x] No    [] Yes (see summary sheet for update)  Subjective functional status/changes:   [] No changes reported  Pt reports she had an \"amazing\" weekend, but woke up this morning with increased pain without cause. OBJECTIVE    31 min Therapeutic Exercise:  [x] See flow sheet :   Rationale: increase ROM and increase strength to improve the patients ability to perform ADLs    10 min Neuromuscular Re-education:  [x]  See flow sheet :   Rationale: improve coordination and improve balance  to improve the patients ability to recover LOB    With   [] TE   [] TA   [] neuro   [] other: Patient Education: [x] Review HEP    [] Progressed/Changed HEP based on:   [] positioning   [] body mechanics   [] transfers   [] heat/ice application    [] other:      Other Objective/Functional Measures: CGA for all balance     Pain Level (0-10 scale) post treatment: 2    ASSESSMENT/Changes in Function: Pt hip abductor strength improving, requiring reduced assistance in sidelying exercises. Pt half stance balance is improving, requiring decreased use of UE reaching strategy to recover LOB.      Patient will continue to benefit from skilled PT services to modify and progress therapeutic interventions, address functional mobility deficits, address ROM deficits, address strength deficits, analyze and address soft tissue restrictions, analyze and cue movement patterns, analyze and modify body mechanics/ergonomics, assess and modify postural abnormalities, address imbalance/dizziness and instruct in home and community integration to attain remaining goals. []  See Plan of Care  []  See progress note/recertification  []  See Discharge Summary         Progress towards goals / Updated goals: 1. Pt will increase FOTO score to 56 points to demonstrate increased ease with ADLs.                PN[de-identified] 42              EH be reassessed at 30day gray  2. Pt will increase score on Tinetti balance and gait testing to < 24/28 to indicate low fall risk.               PN[de-identified] 22/28              To be reassessed at 30day gray  3.  Patient will increase B hip flexion/ abduction strength to 5/5 with MMT to improve ease with squatting/lifting activities.   PN:  right hip flexion: 4/5  right hip abduction: 3/5  left hip flexion: 4/5              Left hip abduction: 3/5              Tolerating progression of exercises; continued left hip abductor weakness    PLAN  [x]  Upgrade activities as tolerated     [x]  Continue plan of care  []  Update interventions per flow sheet       []  Discharge due to:_  []  Other:_      Kvng White, PT 2/19/2020  3:58 PM    Future Appointments   Date Time Provider Heri Arteaga   2/26/2020  3:00 PM Kwesi Morris PT [facility]   2/28/2020  3:00 PM Kwesi Morris PT [facility]

## 2020-02-21 ENCOUNTER — APPOINTMENT (OUTPATIENT)
Dept: PHYSICAL THERAPY | Age: 75
End: 2020-02-21
Payer: MEDICARE

## 2020-02-26 ENCOUNTER — HOSPITAL ENCOUNTER (OUTPATIENT)
Dept: PHYSICAL THERAPY | Age: 75
Discharge: HOME | End: 2020-02-26
Payer: MEDICARE

## 2020-02-26 PROCEDURE — 97110 THERAPEUTIC EXERCISES: CPT

## 2020-02-26 PROCEDURE — 97112 NEUROMUSCULAR REEDUCATION: CPT

## 2020-02-26 PROCEDURE — 97140 MANUAL THERAPY 1/> REGIONS: CPT

## 2020-02-26 NOTE — PROGRESS NOTES
PT DAILY TREATMENT NOTE 10-18    Patient Name: Fernando Jacob  Date:2020  : 1945  [x]  Patient  Verified  Payor: Milan Hoang / Plan: [place] MEDICARE PART A & B / Product Type: Medicare /    In time:253  Out time:356  Total Treatment Time (min): 63  Visit #: 6 of 8    Medicare/BCBS Only   Total Timed Codes (min):  63 1:1 Treatment Time:  58       Treatment Area: Low back pain [M54.5]  Other abnormalities of gait and mobility [R26.89]    SUBJECTIVE  Pain Level (0-10 scale):  2  Any medication changes, allergies to medications, adverse drug reactions, diagnosis change, or new procedure performed?: [x] No    [] Yes (see summary sheet for update)  Subjective functional status/changes:   [] No changes reported  Pt reports pain in her left buttocks/hip and posterior thighs today stating though \"it's not too bad today\"    OBJECTIVE    23 min Therapeutic Exercise:  [x] See flow sheet :   Rationale: increase ROM and increase strength to improve the patients ability to perform ADLs     30 min Neuromuscular Re-education:  [x]  See flow sheet : balance, balance reactions, glut virginia   Rationale: increase strength, improve coordination, improve balance and increase proprioception  to improve the patients ability to recover LOB and ambulate community distances    10 min Manual Therapy:  STM with trigger point release to left glut max/med    Pt consented to all manual therapy    Rationale: decrease pain, increase tissue extensibility and decrease trigger points to tolerate prolonged standing and walking           With   [] TE   [] TA   [] neuro   [] other: Patient Education: [x] Review HEP    [] Progressed/Changed HEP based on:   [] positioning   [] body mechanics   [] transfers   [] heat/ice application    [] other:      Other Objective/Functional Measures: trigger points in left glut med and max     Pain Level (0-10 scale) post treatment: 1    ASSESSMENT/Changes in Function: Pt responding positively to manual therapy with reduction in left sided buttocks/hip pain. Pt functional glut strength remains reduced and pt continues to require assistance in sidelying abduction exercises, however, level of assist is decreasing from mod to min. Pt uncompensated left trendelenburg reduces in left single leg stance when given verbal and tactile cues, but is not eliminated. Will reassess next visit; pt will likely require continued 2x/week due to glut weakness, falls risk, and requiring instruction/assistance with strengthening exercises. Patient will continue to benefit from skilled PT services to modify and progress therapeutic interventions, address functional mobility deficits, address ROM deficits, address strength deficits, analyze and address soft tissue restrictions, analyze and cue movement patterns, analyze and modify body mechanics/ergonomics, assess and modify postural abnormalities, address imbalance/dizziness and instruct in home and community integration to attain remaining goals. []  See Plan of Care  []  See progress note/recertification  []  See Discharge Summary         Progress towards goals / Updated goals: 1. Pt will increase FOTO score to 56 points to demonstrate increased ease with ADLs.                PN[de-identified] 42              VQ be reassessed at 30day gray  2. Pt will increase score on Tinetti balance and gait testing to < 24/28 to indicate low fall risk.               PN[de-identified] 22/28              To be reassessed at 30day gray  3.  Patient will increase B hip flexion/ abduction strength to 5/5 with MMT to improve ease with squatting/lifting activities.   PN:  right hip flexion: 4/5  right hip abduction: 3/5  left hip flexion: 4/5              Left hip abduction: 3/5              Tolerating progression of exercises; continued left hip abductor weakness    PLAN  [x]  Upgrade activities as tolerated     [x]  Continue plan of care  []  Update interventions per flow sheet       []  Discharge due to:_  []  Other:_ Evaristo Hermosillo, PT 2/26/2020  2:56 PM    Future Appointments   Date Time Provider Heri Arteaga   2/26/2020  3:00 PM Moira Munoz [address]   2/28/2020  3:00 PM Roshan Elizabeth, PT [facility] [address]

## 2020-02-28 ENCOUNTER — HOSPITAL ENCOUNTER (OUTPATIENT)
Dept: PHYSICAL THERAPY | Age: 75
Discharge: HOME | End: 2020-02-28
Payer: MEDICARE

## 2020-02-28 PROCEDURE — 97110 THERAPEUTIC EXERCISES: CPT

## 2020-02-28 NOTE — PROGRESS NOTES
[facility] [address]  [address], [address] [phone] fax    Continued Plan of Care/ Re-certification for Physical Therapy Services    Patient name: Gaye Saunders Start of Care: 2019   Referral source: Louis Fuentes MD : 1945               Medical Diagnosis: Low back pain [M54.5]  Other abnormalities of gait and mobility [R26.89]  Payor: [place] MEDICARE / Plan: VA MEDICARE PART A & B / Product Type: Medicare /     Onset Date:2019               Treatment Diagnosis: LBP, balance & gait deficits   Prior Hospitalization: see medical history Provider#: [id number]   Medications: Verified on Patient summary List    Comorbidities: HTN, DM, OA   Prior Level of Function: retired. Lives in [address] [place] home. Has RW and SPC    Visits from Start of Care: 15    Missed Visits: 1    The Plan of Care and following information is based on the patient's current status: 1. Pt will increase FOTO score to 56 points to demonstrate increased ease with ADLs.                PN[de-identified] 86              [id number] = 47  2. Pt will increase score on Tinetti balance and gait testing to < 24/28 to indicate low fall risk.               PN[de-identified] 22/28              No change = , limited by use of AD  3.  Patient will increase B hip flexion/ abduction strength to 5/5 with MMT to improve ease with squatting/lifting activities.   PN:  right hip flexion: 4/5  right hip abduction: 3/5  left hip flexion: 4/5              Left hip abduction: 3/5              Progressing: flexion: bilaterally = 4/5, abduction: bilaterally = 3+/5    Key functional changes:   Functional Gains: improved walking, use of SPC vs AD, decreased pain  Functional Deficits: continued LE pain, use of SPC vs no AD, prolonged walking  % improvement: 50%  Pain   Average: 310       Best: 0/10     Worst: 3-410  Patient Goal: \"walk without the cane, girl\"      Problems/ barriers to goal attainment: pain, weakness    Problem List: pain affecting function, decrease ROM, decrease strength, impaired gait/ balance, decrease ADL/ functional abilitiies, decrease activity tolerance, decrease flexibility/ joint mobility and decrease transfer abilities    Treatment Plan: Therapeutic exercise, Therapeutic activities, Neuromuscular re-education, Physical agent/modality, Gait/balance training, Manual therapy, Aquatic therapy, Patient education, Self Care training, Functional mobility training, Home safety training and Stair training     Goals for this certification period to be accomplished in 4 weeks: 1. Pt will increase FOTO score to 56 points to demonstrate increased ease with ADLs.                 PN[de-identified] Progressing = 47  2. Pt will increase score on Tinetti balance and gait testing to < 24/28 to indicate low fall risk.                PN[de-identified] No change = 22/28, limited by use of AD  3. Patient will increase B hip flexion/ abduction strength to 4+/5 with MMT to improve ease with squatting/lifting activities.    PN: updated, Progressing: flexion: bilaterally = 4/5, abduction: bilaterally = 3+/5    Frequency / Duration: Patient to be seen 1-2 times per week for 8 treatments:    Assessment / Recommendations: Ms. Nancy Buckley has reached a current plateau with strength and balance assessments, however, Tinetti score is impacted by use of AD and may not reflect progression appropriately. Pt's hip abductor weakness is improving, but progress is slow due to atrophy and stenosis. This weakness prevents pt's ability to ambulate without AD. Pt's program will be adjusted to address remaining deficits; pt will attend 2x/week for the next 4 weeks and will plan to DC at the end of POC. Certification Period: 2/28/20-3/28/20    Kailyn Carter PT 2/28/2020 3:01 PM    ________________________________________________________________________  I certify that the above Therapy Services are being furnished while the patient is under my care.  I agree with the treatment plan and certify that this therapy is necessary. [] I have read the above and request that my patient continue as recommended.   [] I have read the above report and request that my patient continue therapy with the following changes/special instructions: _____________________________________________  [] I have read the above report and request that my patient be discharged from therapy    Physician's Signature:____________Date:_________TIME:________    ** Signature, Date and Time must be completed for valid certification **    Please sign and return to [facility] [place] [id number]  [address] [facility], [address]  [phone] fax

## 2020-02-28 NOTE — PROGRESS NOTES
PT DAILY TREATMENT NOTE 10-18    Patient Name: Ganesh Pichardo  Date:2020  : 1945  [x]  Patient  Verified  Payor: VA MEDICARE / Plan: VA MEDICARE PART A & B / Product Type: Medicare /    In time:256  Out time:338  Total Treatment Time (min): 42  Visit #: 7 of 8    Medicare/BCBS Only   Total Timed Codes (min):  42 1:1 Treatment Time:  42       Treatment Area: Low back pain [M54.5]  Other abnormalities of gait and mobility [R26.89]    SUBJECTIVE  Pain Level (0-10 scale): 1  Any medication changes, allergies to medications, adverse drug reactions, diagnosis change, or new procedure performed?: [x] No    [] Yes (see summary sheet for update)  Subjective functional status/changes:   [] No changes reported  See recert    OBJECTIVE    42 min Therapeutic Exercise:  [x] See flow sheet :   Rationale: increase ROM and increase strength to improve the patients ability to perform ADLs    With   [] TE   [] TA   [] neuro   [] other: Patient Education: [x] Review HEP    [] Progressed/Changed HEP based on:   [] positioning   [] body mechanics   [] transfers   [] heat/ice application    [] other:      Other Objective/Functional Measures: FOTO = 47, tinetti = 22/28, strength: hip flexion: bilateral 4/5, abduction = 3+/5 bilaterally      Pain Level (0-10 scale) post treatment: 0    ASSESSMENT/Changes in Function: see recert    Patient will continue to benefit from skilled PT services to modify and progress therapeutic interventions, address functional mobility deficits, address ROM deficits, address strength deficits, analyze and address soft tissue restrictions, analyze and cue movement patterns, analyze and modify body mechanics/ergonomics, assess and modify postural abnormalities, address imbalance/dizziness and instruct in home and community integration to attain remaining goals.      []  See Plan of Care  [x]  See progress note/recertification  []  See Discharge Summary         Progress towards goals / Updated goals: 1. Pt will increase FOTO score to 56 points to demonstrate increased ease with ADLs.                 PN[de-identified] Progressing = 47  2. Pt will increase score on Tinetti balance and gait testing to < 24/28 to indicate low fall risk.                PN[de-identified] No change = 22/28, limited by use of AD  3. Patient will increase B hip flexion/ abduction strength to 4+/5 with MMT to improve ease with squatting/lifting activities.                PN: updated, Progressing: flexion: bilaterally = 4/5, abduction: bilaterally = 3+/5       PLAN  [x]  Upgrade activities as tolerated     [x]  Continue plan of care  []  Update interventions per flow sheet       []  Discharge due to:_  []  Other:_      Mei Escalera, PT 2/28/2020  3:43 PM    No future appointments.

## 2020-03-04 ENCOUNTER — HOSPITAL ENCOUNTER (OUTPATIENT)
Dept: PHYSICAL THERAPY | Age: 75
Discharge: HOME | End: 2020-03-04
Payer: MEDICARE

## 2020-03-04 PROCEDURE — 97110 THERAPEUTIC EXERCISES: CPT

## 2020-03-04 PROCEDURE — 97112 NEUROMUSCULAR REEDUCATION: CPT

## 2020-03-04 NOTE — PROGRESS NOTES
PT DAILY TREATMENT NOTE 10-18    Patient Name: Yolanda Mahmood  Date:3/4/2020  : 1945  [x]  Patient  Verified  Payor: [employer] / Plan: VA MEDICARE PART A & B / Product Type: Medicare /    In time:302  Out time:352  Total Treatment Time (min): 50  Visit #: 1 of 8    Medicare/BCBS Only   Total Timed Codes (min):  50 1:1 Treatment Time:  38       Treatment Area: Low back pain [M54.5]  Other abnormalities of gait and mobility [R26.89]    SUBJECTIVE  Pain Level (0-10 scale): 2  Any medication changes, allergies to medications, adverse drug reactions, diagnosis change, or new procedure performed?: [x] No    [] Yes (see summary sheet for update)  Subjective functional status/changes:   [] No changes reported  Pt reports she was doing really well today until about an hour before when the back of her thighs started hurting    OBJECTIVE    28 min Therapeutic Exercise:  [x] See flow sheet :   Rationale: increase ROM, increase strength and improve balance to improve the patients ability to perform ADLs    10 min Neuromuscular Re-education:  [x]  See flow sheet :   Rationale: increase strength, improve coordination and improve balance  to improve the patients ability to recover LOB, improve gait pattern          With   [] TE   [] TA   [] neuro   [] other: Patient Education: [x] Review HEP    [] Progressed/Changed HEP based on:   [] positioning   [] body mechanics   [] transfers   [] heat/ice application    [] other:      Other Objective/Functional Measures: progressed exercises per flow sheet      Pain Level (0-10 scale) post treatment: 1    ASSESSMENT/Changes in Function: Pt was challenged with new exercises against the wall; unable to complete standing hip abduction isometric due to pain. Requries cuing to PPT when performing wall sit to prevent symptoms.      Patient will continue to benefit from skilled PT services to modify and progress therapeutic interventions, address functional mobility deficits, address ROM deficits, address strength deficits, analyze and address soft tissue restrictions, analyze and cue movement patterns, analyze and modify body mechanics/ergonomics, assess and modify postural abnormalities, address imbalance/dizziness and instruct in home and community integration to attain remaining goals. []  See Plan of Care  []  See progress note/recertification  []  See Discharge Summary         Progress towards goals / Updated goals: 1. Pt will increase FOTO score to 56 points to demonstrate increased ease with ADLs.                 PN[de-identified] Progressing = 47   To be reassessed at 30day gray  2. Pt will increase score on Tinetti balance and gait testing to < 24/28 to indicate low fall risk.                PN[de-identified] No change = 22/28, limited by use of AD   To be reassessed at 30day gray  3. Patient will increase B hip flexion/ abduction strength to 4+/5 with MMT to improve ease with squatting/lifting activities.                 PN: updated, Progressing: flexion: bilaterally = 4/5, abduction: bilaterally = 3+/5      PLAN  [x]  Upgrade activities as tolerated     [x]  Continue plan of care  []  Update interventions per flow sheet       []  Discharge due to:_  []  Other:_      Johnny Lilly, PT 3/4/2020  5:25 PM    Future Appointments   Date Time Provider Heri Arteaga   3/6/2020  2:30 PM Esdras Pride PT [facility] [address]   3/11/2020  2:30 PM Sivakumar Grief [address]   3/13/2020  2:30 PM Sivakumar Grief [address]   3/18/2020  2:30 PM Sivakumar Grief [address]   3/20/2020  3:00 PM Sivakumar Grief [address]   3/25/2020  2:30 PM Sivakumar Grief [address]   3/27/2020  2:30 PM Sivakumar Grief [address]

## 2020-03-06 ENCOUNTER — HOSPITAL ENCOUNTER (OUTPATIENT)
Dept: PHYSICAL THERAPY | Age: 75
Discharge: HOME | End: 2020-03-06
Payer: MEDICARE

## 2020-03-06 PROCEDURE — 97112 NEUROMUSCULAR REEDUCATION: CPT

## 2020-03-06 PROCEDURE — 97110 THERAPEUTIC EXERCISES: CPT

## 2020-03-06 NOTE — PROGRESS NOTES
PT DAILY TREATMENT NOTE 10-18    Patient Name: Cammie Navarrete  Date:3/6/2020  : 1945  [x]  Patient  Verified  Payor: VA MEDICARE / Plan: VA MEDICARE PART A & B / Product Type: Medicare /    In time:245  Out time:348  Total Treatment Time (min): 63  Visit #: 2 of 8    Medicare/BCBS Only   Total Timed Codes (min):  63 1:1 Treatment Time:  40       Treatment Area: Low back pain [M54.5]  Other abnormalities of gait and mobility [R26.89]    SUBJECTIVE  Pain Level (0-10 scale): 0  Any medication changes, allergies to medications, adverse drug reactions, diagnosis change, or new procedure performed?: [x] No    [] Yes (see summary sheet for update)  Subjective functional status/changes:   [] No changes reported  \"I'm having a great day, no pain\"    OBJECTIVE    43 min Therapeutic Exercise:  [x] See flow sheet :   Rationale: increase ROM and increase strength to improve the patients ability to perform ADLs     20 min Neuromuscular Re-education:  [x]  See flow sheet :   Rationale: increase strength, improve coordination and improve balance  to improve the patients ability to recover LOB and normalize gait    With   [] TE   [] TA   [] neuro   [] other: Patient Education: [x] Review HEP    [] Progressed/Changed HEP based on:   [] positioning   [] body mechanics   [] transfers   [] heat/ice application    [] other:      Other Objective/Functional Measures: progressed exercises per flow sheet      Pain Level (0-10 scale) post treatment: 0    ASSESSMENT/Changes in Function: Pt has increase in pain to 1/10 with standing exercises, reduced with flexion based exercises. Pt was able to complete hip hiking today, significantly more challenged on left than on right. Pt confidence and ability to correct LOB independently significantly improving.      Patient will continue to benefit from skilled PT services to modify and progress therapeutic interventions, address functional mobility deficits, address ROM deficits, address strength deficits, analyze and address soft tissue restrictions, analyze and cue movement patterns, analyze and modify body mechanics/ergonomics, assess and modify postural abnormalities, address imbalance/dizziness and instruct in home and community integration to attain remaining goals. []  See Plan of Care  []  See progress note/recertification  []  See Discharge Summary         Progress towards goals / Updated goals: 1. Pt will increase FOTO score to 56 points to demonstrate increased ease with ADLs.                 PN[de-identified] Progressing = 47              To be reassessed at 30day gray  2. Pt will increase score on Tinetti balance and gait testing to < 24/28 to indicate low fall risk.                PN[de-identified] No change = 22/28, limited by use of AD              To be reassessed at 30day gray  3. Patient will increase B hip flexion/ abduction strength to 4+/5 with MMT to improve ease with squatting/lifting activities.                 PN: updated, Progressing: flexion: bilaterally = 4/5, abduction: bilaterally = 3+/5   Progressing exercises, continued assistance on abduction exercises    PLAN  [x]  Upgrade activities as tolerated     [x]  Continue plan of care  []  Update interventions per flow sheet       []  Discharge due to:_  []  Other:_      Melinda Pleitez, PT 3/6/2020  4:05 PM    Future Appointments   Date Time Provider Heri Arteaga   3/11/2020  2:30 PM Yahaira Alvarado PT [facility]   3/13/2020  2:30 PM Ghada Rojo [facility]   3/18/2020  2:30 PM Ghada Rojo [facility]   3/20/2020  3:00 PM Ghada Rojo [facility]   3/25/2020  2:30 PM Ghada Rojo [facility]   3/27/2020  2:30 PM Ghada Rojo [facility]

## 2020-03-11 ENCOUNTER — HOSPITAL ENCOUNTER (OUTPATIENT)
Dept: PHYSICAL THERAPY | Age: 75
Discharge: HOME | End: 2020-03-11
Payer: MEDICARE

## 2020-03-11 PROCEDURE — 97110 THERAPEUTIC EXERCISES: CPT

## 2020-03-11 PROCEDURE — 97112 NEUROMUSCULAR REEDUCATION: CPT

## 2020-03-11 NOTE — PROGRESS NOTES
PT DAILY TREATMENT NOTE 10-18    Patient Name: Radha Richardson  Date:3/11/2020  : 1945  [x]  Patient  Verified  Payor: VA MEDICARE / Plan: VA MEDICARE PART A & B / Product Type: Medicare /    In time:225  Out time:322  Total Treatment Time (min): 48  Visit #: 3 of 8    Medicare/BCBS Only   Total Timed Codes (min):  53 1:1 Treatment Time:  41       Treatment Area: Low back pain [M54.5]  Other abnormalities of gait and mobility [R26.89]    SUBJECTIVE  Pain Level (0-10 scale): 1  Any medication changes, allergies to medications, adverse drug reactions, diagnosis change, or new procedure performed?: [x] No    [] Yes (see summary sheet for update)  Subjective functional status/changes:   [] No changes reported  \"I'm doing ok today. \"    OBJECTIVE    23 min Therapeutic Exercise:  [x] See flow sheet :   Rationale: increase ROM and increase strength to improve the patients ability to perform ADLs     30 min Neuromuscular Re-education:  [x]  See flow sheet :   Rationale: increase strength, improve coordination, improve balance and increase proprioception  to improve the patients ability to recover LOB and normalize gait pattern           With   [] TE   [] TA   [] neuro   [] other: Patient Education: [x] Review HEP    [] Progressed/Changed HEP based on:   [] positioning   [] body mechanics   [] transfers   [] heat/ice application    [] other:      Other Objective/Functional Measures: gait in parallel bars: forward and back without ue x3 laps each; 3 laps each with right LE marching; gait outside parallel bars, CGA and no AD with mirror feedback and 2 laps with green TB anterior to posterior resistance to increase posterior chain activation on left      Pain Level (0-10 scale) post treatment: 0    ASSESSMENT/Changes in Function: Adjusted cane height; pt demonstrating improved gait after adjustment.  Pt continues to be challenge in single leg stance on the left leg due to glut med weakness; pt is unable to perform gait without trendelenburg despite cuing and mirror feedback when not using AD. Supine hip abduction is improving as pt requires fewer cues/assistance to prevent ER substitutions. Patient will continue to benefit from skilled PT services to modify and progress therapeutic interventions, address functional mobility deficits, address ROM deficits, address strength deficits, analyze and address soft tissue restrictions, analyze and cue movement patterns, analyze and modify body mechanics/ergonomics, assess and modify postural abnormalities, address imbalance/dizziness and instruct in home and community integration to attain remaining goals. []  See Plan of Care  []  See progress note/recertification  []  See Discharge Summary         Progress towards goals / Updated goals: 1. Pt will increase FOTO score to 56 points to demonstrate increased ease with ADLs.                 PN[de-identified] Progressing = 47              HH be reassessed at 30day gray  2. Pt will increase score on Tinetti balance and gait testing to < 24/28 to indicate low fall risk.                PN[de-identified] No change = 22/28, limited by use of AD              To be reassessed at 30day gray  3. Patient will increase B hip flexion/ abduction strength to 4+/5 with MMT to improve ease with squatting/lifting activities.                 PN: updated, Progressing: flexion: bilaterally = 4/5, abduction: bilaterally = 3+/5              Progressing exercises, continued assistance on abduction exercises    PLAN  [x]  Upgrade activities as tolerated     [x]  Continue plan of care  []  Update interventions per flow sheet       []  Discharge due to:_  []  Other:_      Caty Gong, PT 3/11/2020  2:30 PM    Future Appointments   Date Time Provider Heri Arteaga   3/13/2020  2:30 PM Huong Hurd PT [facility]   3/18/2020  2:30 PM [facility]   3/20/2020  3:00 PM Liam Sylvester [facility]   3/25/2020  2:30 PM Liam Sylvester [facility] 3/27/2020  2:30 PM Piter Alegre[facility]

## 2020-03-13 ENCOUNTER — HOSPITAL ENCOUNTER (OUTPATIENT)
Dept: PHYSICAL THERAPY | Age: 75
Discharge: HOME | End: 2020-03-13
Payer: MEDICARE

## 2020-03-13 PROCEDURE — 97116 GAIT TRAINING THERAPY: CPT

## 2020-03-13 PROCEDURE — 97110 THERAPEUTIC EXERCISES: CPT

## 2020-03-13 NOTE — PROGRESS NOTES
PT DAILY TREATMENT NOTE 10-18    Patient Name: Richard Delay  Date:3/13/2020  : 1945  [x]  Patient  Verified  Payor: VA MEDICARE / Plan: VA MEDICARE PART A & B / Product Type: Medicare /    In time:232  Out time:337  Total Treatment Time (min): 65  Visit #: 4 of 8    Medicare/BCBS Only   Total Timed Codes (min):  65 1:1 Treatment Time:  65       Treatment Area: Low back pain [M54.5]  Other abnormalities of gait and mobility [R26.89]    SUBJECTIVE  Pain Level (0-10 scale): 0  Any medication changes, allergies to medications, adverse drug reactions, diagnosis change, or new procedure performed?: [x] No    [] Yes (see summary sheet for update)  Subjective functional status/changes:   [] No changes reported  \"I;m having a good day today\"    OBJECTIVE    50 min Therapeutic Exercise:  [x] See flow sheet :   Rationale: increase ROM and increase strength to improve the patients ability to perform ADLs    15 min Gait Training:  Without AD and CGA in parallel bars 3 laps with right LE marchign and mirror feedback to increase left weight acceptance; lateral walks in parallel bars x3 laps; [id number] with mirror feed back no AD with CGA; 3 passes with left to right green TB resistance at shoulder to reduce trendelenburg, 3 passes with anterior to posterior resistance at hip to increased left terminal stance   Rationale: normalize gait pattern          With   [] TE   [] TA   [] neuro   [] other: Patient Education: [x] Review HEP    [] Progressed/Changed HEP based on:   [] positioning   [] body mechanics   [] transfers   [] heat/ice application    [] other:      Other Objective/Functional Measures: see above     Pain Level (0-10 scale) post treatment: 0    ASSESSMENT/Changes in Function: Pt demonstrating significantly reduced left trendelenburg after lateral resistance at her shoulder. Pt demonstrating decreased trunk sway and improved terminal stance by end of gait training.  Pt able to complete right hip abduction without assistance, left continues to require min A to complete. No assistance on supine hip abduction and performed without ER substitutions    Patient will continue to benefit from skilled PT services to modify and progress therapeutic interventions, address functional mobility deficits, address ROM deficits, address strength deficits, analyze and address soft tissue restrictions, analyze and cue movement patterns, analyze and modify body mechanics/ergonomics, assess and modify postural abnormalities, address imbalance/dizziness and instruct in home and community integration to attain remaining goals. []  See Plan of Care  []  See progress note/recertification  []  See Discharge Summary         Progress towards goals / Updated goals: 1. Pt will increase FOTO score to 56 points to demonstrate increased ease with ADLs.                 PN[de-identified] Progressing = 47              MR be reassessed at 30day gray  2. Pt will increase score on Tinetti balance and gait testing to < 24/28 to indicate low fall risk.                PN[de-identified] No change = 22/28, limited by use of AD              To be reassessed at 30day gray  3. Patient will increase B hip flexion/ abduction strength to 4+/5 with MMT to improve ease with squatting/lifting activities.                 PN: updated, Progressing: flexion: bilaterally = 4/5, abduction: bilaterally = 3+/5              Progressing exercises, continued assistance on abduction exercises on left, not on right        PLAN  [x]  Upgrade activities as tolerated     [x]  Continue plan of care  []  Update interventions per flow sheet       []  Discharge due to:_  []  Other:_      Mikhail Waller, PT 3/13/2020  3:48 PM    Future Appointments   Date Time Provider Heri Arteaga   3/18/2020  2:30 PM Argenis Mark [address]   3/20/2020  3:00 PM Argenis Jas [address]   3/25/2020  2:30 PM Argenis Jas [address]   3/27/2020  2:30 PM Argenis Jas [address]

## 2020-03-18 ENCOUNTER — APPOINTMENT (OUTPATIENT)
Dept: PHYSICAL THERAPY | Age: 75
End: 2020-03-18
Payer: MEDICARE

## 2020-03-20 ENCOUNTER — APPOINTMENT (OUTPATIENT)
Dept: PHYSICAL THERAPY | Age: 75
End: 2020-03-20
Payer: MEDICARE

## 2020-03-25 ENCOUNTER — APPOINTMENT (OUTPATIENT)
Dept: PHYSICAL THERAPY | Age: 75
End: 2020-03-25
Payer: MEDICARE

## 2020-03-27 ENCOUNTER — APPOINTMENT (OUTPATIENT)
Dept: PHYSICAL THERAPY | Age: 75
End: 2020-03-27
Payer: MEDICARE

## 2020-04-15 NOTE — PROGRESS NOTES
[facility] [place] [address]  [address], [address] [phone] fax    Discharge Summary  Patient name: Lavinia Metz Boots of Care: 2017   Referral source: Suma Hamilton MD : 1945               Medical Diagnosis: Low back pain [M54.5]  Other abnormalities of gait and mobility [R26.89]  [place]or: [place] MED[place] / Plan: [place] MEDICARE PART A & B / Product Type: Medicare /     Onset Date:5188               Treatment Diagnosis: LBP, balance & gait deficits   Prior Hospitalization: see medical history Provider#: [id number]   Medications: Verified on Patient summary List    Comorbidities: HTN, DM, OA   Prior Level of Function: retired. Lives in [address] [place] home. Has RW and SPC       Visits from Start of Care: 19    Missed Visits: 1    Reporting Period : 20 to 3/13/20    1. Pt will increase FOTO score to 56 points to demonstrate increased ease with ADLs.                 PN[de-identified] Progressing = 47              unable to reassess - not met  2. Pt will increase score on Tinetti balance and gait testing to < 24/28 to indicate low fall risk.                PN[de-identified] No change = 22/28, limited by use of AD              unable to reassess - not met  3. Patient will increase B hip flexion/ abduction strength to 4+/5 with MMT to improve ease with squatting/lifting activities.                 PN: updated, Progressing: flexion: bilaterally = 4/5, abduction: bilaterally = 3+/5              unable to reassess - not met       Assessment/Summary of care: Ms. Sarah Morgan was seen for 19 visits for LBP/gait, last visit in clinic on 3/13/20. Due to covid-19 scheduling limitations the patient is unable to be seen in the clinic. The patient has declined telehealth services at this time, and will continue independently with provided HEP to work on remaining deficits. The patient will be discharged from outpatient PT services at this time.        RECOMMENDATIONS:  [x]Discontinue therapy: []Patient has reached or is progressing toward set goals      [x]Patient has abdicated      []Due to lack of appreciable progress towards set [address], PT 4/15/2020 11:35 AM

## 2022-03-18 PROBLEM — E66.01 SEVERE OBESITY (HCC): Status: ACTIVE | Noted: 2019-04-26

## 2024-04-29 ENCOUNTER — TRANSCRIBE ORDERS (OUTPATIENT)
Facility: HOSPITAL | Age: 79
End: 2024-04-29

## 2024-04-29 ENCOUNTER — HOSPITAL ENCOUNTER (OUTPATIENT)
Facility: HOSPITAL | Age: 79
Discharge: HOME | End: 2024-05-02
Payer: MEDICARE

## 2024-04-29 DIAGNOSIS — E08.00 DIABETES MELLITUS DUE TO UNDERLYING CONDITION WITH HYPEROSMOLARITY WITHOUT COMA, WITHOUT LONG-TERM CURRENT USE OF INSULIN (HCC): ICD-10-CM

## 2024-04-29 DIAGNOSIS — I10 HYPERTENSION, ESSENTIAL: ICD-10-CM

## 2024-04-29 DIAGNOSIS — N18.31 CHRONIC KIDNEY DISEASE (CKD) STAGE G3A/A1, MODERATELY DECREASED GLOMERULAR FILTRATION RATE (GFR) BETWEEN 45-59 ML/MIN/1.73 SQUARE METER AND ALBUMINURIA CREATININE RATIO LESS THAN 30 MG/G (HCC): Primary | ICD-10-CM

## 2024-04-29 DIAGNOSIS — N18.31 CHRONIC KIDNEY DISEASE (CKD) STAGE G3A/A1, MODERATELY DECREASED GLOMERULAR FILTRATION RATE (GFR) BETWEEN 45-59 ML/MIN/1.73 SQUARE METER AND ALBUMINURIA CREATININE RATIO LESS THAN 30 MG/G (HCC): ICD-10-CM

## 2024-04-29 LAB
ALBUMIN SERPL-MCNC: 3.7 G/DL (ref 3.4–5)
ANION GAP SERPL CALC-SCNC: 5 MMOL/L (ref 3–18)
APPEARANCE UR: CLEAR
BACTERIA URNS QL MICRO: ABNORMAL /HPF
BASOPHILS # BLD: 0 K/UL (ref 0–0.1)
BASOPHILS NFR BLD: 1 % (ref 0–2)
BILIRUB UR QL: NEGATIVE
BUN SERPL-MCNC: 25 MG/DL (ref 7–18)
BUN/CREAT SERPL: 19 (ref 12–20)
CALCIUM SERPL-MCNC: 10.1 MG/DL (ref 8.5–10.1)
CALCIUM SERPL-MCNC: 9.8 MG/DL (ref 8.5–10.1)
CHLORIDE SERPL-SCNC: 103 MMOL/L (ref 100–111)
CO2 SERPL-SCNC: 31 MMOL/L (ref 21–32)
COLOR UR: YELLOW
CREAT SERPL-MCNC: 1.29 MG/DL (ref 0.6–1.3)
CREAT UR-MCNC: 148 MG/DL (ref 30–125)
CREAT UR-MCNC: 150 MG/DL (ref 30–125)
DIFFERENTIAL METHOD BLD: ABNORMAL
EOSINOPHIL # BLD: 0.1 K/UL (ref 0–0.4)
EOSINOPHIL NFR BLD: 2 % (ref 0–5)
EPITH CASTS URNS QL MICRO: ABNORMAL /LPF (ref 0–5)
ERYTHROCYTE [DISTWIDTH] IN BLOOD BY AUTOMATED COUNT: 12.4 % (ref 11.6–14.5)
GLUCOSE SERPL-MCNC: 169 MG/DL (ref 74–99)
GLUCOSE UR STRIP.AUTO-MCNC: NEGATIVE MG/DL
HCT VFR BLD AUTO: 39 % (ref 35–45)
HGB BLD-MCNC: 12.4 G/DL (ref 12–16)
HGB UR QL STRIP: NEGATIVE
IMM GRANULOCYTES # BLD AUTO: 0 K/UL (ref 0–0.04)
IMM GRANULOCYTES NFR BLD AUTO: 0 % (ref 0–0.5)
KETONES UR QL STRIP.AUTO: NEGATIVE MG/DL
LEUKOCYTE ESTERASE UR QL STRIP.AUTO: ABNORMAL
LYMPHOCYTES # BLD: 1.5 K/UL (ref 0.9–3.6)
LYMPHOCYTES NFR BLD: 29 % (ref 21–52)
MCH RBC QN AUTO: 30.2 PG (ref 24–34)
MCHC RBC AUTO-ENTMCNC: 31.8 G/DL (ref 31–37)
MCV RBC AUTO: 95.1 FL (ref 78–100)
MICROALBUMIN UR-MCNC: 25 MG/DL (ref 0–3)
MICROALBUMIN/CREAT UR-RTO: 169 MG/G (ref 0–30)
MONOCYTES # BLD: 0.5 K/UL (ref 0.05–1.2)
MONOCYTES NFR BLD: 9 % (ref 3–10)
NEUTS SEG # BLD: 3.1 K/UL (ref 1.8–8)
NEUTS SEG NFR BLD: 59 % (ref 40–73)
NITRITE UR QL STRIP.AUTO: NEGATIVE
NRBC # BLD: 0 K/UL (ref 0–0.01)
NRBC BLD-RTO: 0 PER 100 WBC
PH UR STRIP: 5.5 (ref 5–8)
PHOSPHATE SERPL-MCNC: 2.8 MG/DL (ref 2.5–4.9)
PLATELET # BLD AUTO: 281 K/UL (ref 135–420)
PMV BLD AUTO: 9.5 FL (ref 9.2–11.8)
POTASSIUM SERPL-SCNC: 3.7 MMOL/L (ref 3.5–5.5)
PROT UR STRIP-MCNC: 30 MG/DL
PROT UR-MCNC: 45 MG/DL
PTH-INTACT SERPL-MCNC: 81.5 PG/ML (ref 18.4–88)
RBC # BLD AUTO: 4.1 M/UL (ref 4.2–5.3)
RBC #/AREA URNS HPF: NEGATIVE /HPF (ref 0–5)
SODIUM SERPL-SCNC: 139 MMOL/L (ref 136–145)
SP GR UR REFRACTOMETRY: 1.02 (ref 1–1.03)
URATE SERPL-MCNC: 7.9 MG/DL (ref 2.6–7.2)
UROBILINOGEN UR QL STRIP.AUTO: 1 EU/DL (ref 0.2–1)
WBC # BLD AUTO: 5.2 K/UL (ref 4.6–13.2)
WBC URNS QL MICRO: ABNORMAL /HPF (ref 0–4)
YEAST URNS QL MICRO: ABNORMAL

## 2024-04-29 PROCEDURE — 84165 PROTEIN E-PHORESIS SERUM: CPT

## 2024-04-29 PROCEDURE — 85025 COMPLETE CBC W/AUTO DIFF WBC: CPT

## 2024-04-29 PROCEDURE — 83970 ASSAY OF PARATHORMONE: CPT

## 2024-04-29 PROCEDURE — 84156 ASSAY OF PROTEIN URINE: CPT

## 2024-04-29 PROCEDURE — 82043 UR ALBUMIN QUANTITATIVE: CPT

## 2024-04-29 PROCEDURE — 83521 IG LIGHT CHAINS FREE EACH: CPT

## 2024-04-29 PROCEDURE — 36415 COLL VENOUS BLD VENIPUNCTURE: CPT

## 2024-04-29 PROCEDURE — 80069 RENAL FUNCTION PANEL: CPT

## 2024-04-29 PROCEDURE — 84550 ASSAY OF BLOOD/URIC ACID: CPT

## 2024-04-29 PROCEDURE — 82570 ASSAY OF URINE CREATININE: CPT

## 2024-04-29 PROCEDURE — 81001 URINALYSIS AUTO W/SCOPE: CPT

## 2024-04-30 LAB
KAPPA LC FREE SER-MCNC: 53.3 MG/L (ref 3.3–19.4)
KAPPA LC FREE/LAMBDA FREE SER: 1.5 (ref 0.26–1.65)
LAMBDA LC FREE SERPL-MCNC: 35.6 MG/L (ref 5.7–26.3)

## 2024-05-02 LAB
ALBUMIN SERPL ELPH-MCNC: 3.7 G/DL (ref 2.9–4.4)
ALBUMIN/GLOB SERPL: 1 (ref 0.7–1.7)
ALPHA1 GLOB SERPL ELPH-MCNC: 0.2 G/DL (ref 0–0.4)
ALPHA2 GLOB SERPL ELPH-MCNC: 0.9 G/DL (ref 0.4–1)
B-GLOBULIN SERPL ELPH-MCNC: 1 G/DL (ref 0.7–1.3)
GAMMA GLOB SERPL ELPH-MCNC: 1.6 G/DL (ref 0.4–1.8)
GLOBULIN SER CALC-MCNC: 3.7 G/DL (ref 2.2–3.9)
M PROTEIN SERPL ELPH-MCNC: NORMAL G/DL
PROT SERPL-MCNC: 7.4 G/DL (ref 6–8.5)

## 2024-08-19 ENCOUNTER — CLINICAL DOCUMENTATION (OUTPATIENT)
Facility: HOSPITAL | Age: 79
End: 2024-08-19

## 2024-08-19 NOTE — PROGRESS NOTES
Albina Basilio  Appointment: 2024 2:45 PM  Location: [facility]  Patient #: [id number]  : 1945  Undefined / Language: English / Race: Black or   Female      History of Present Illness (Yuriy Madera MD; 2024 5:09 PM)  The patient is a 79 year old female who presents with chronic kidney disease.This is classified as stage 3.    Note: 80 y/o female referred for evaluation of CKD    pmh:  1) Dm2 diagnosed > 10 years , last HbA1c of 7.2 , neuropathy + , no h/o retinopathy  2) HTN > 10 years , uncontrolled  3) BUrsitis  4) ckd 3b    patient has been referred d/t elevated creat 1.2-1.4 range  nocturia+  Left LE neuropathy  no SOB, edema  denies NSAID  BP uncontrolled    interval history:    no labs for this visit  no symptoms  BP well controlled    Assessment & Plan (Yuriy Madera MD; 2024 5:10 PM)    Stage 3a chronic kidney disease (N18.31) <HCCv24 138  HCCv28 329>  Impression: 1) CKD3b , HTN nephrosclerosis /diabetic nephropathy , creat 1.4 , egfr 41 , renal USG negative for obst , proteinuria needs to be quantified  2) Dm2 diagnosed > 10 years , last HbA1c of 7.2 , neuropathy + , no h/o retinopathy  3) HTN > 10 years , uncontrolled  4) Bursitis    Plan:  1) check BP at home , BP < 130/80  2) continue present BPmeds  3) goal HbA1c <7  4) no NSAIDs  5) CKD labs now --> were not done prior to visit , patient to call when labs done ,  will need to be followed  6) life style measures discussed    f/u in 6 mths time  please cc with thanks to Dr Susana Macias  Problem List/Past Medical (Sariah Sarmiento; 2024 2:26 PM)  Pneumonia (J18.9)    Troponin level elevated (R79.89)    Type 2 diabetes mellitus (E11.9)    HTN (hypertension) (I10)    Vitamin D deficiency (E55.9)    HLD (hyperlipidemia) (E78.5)    Stage 3a chronic kidney disease (N18.31)    Problems Reconciled      Allergies (Sariah Sarmiento; 2024 2:26 PM)  No Known Drug Allergies   [2024]:  Allergies Reconciled

## 2024-09-24 ENCOUNTER — HOSPITAL ENCOUNTER (EMERGENCY)
Facility: HOSPITAL | Age: 79
Discharge: HOME | End: 2024-09-25
Attending: EMERGENCY MEDICINE
Payer: MEDICARE

## 2024-09-24 DIAGNOSIS — S39.012A STRAIN OF LUMBAR REGION, INITIAL ENCOUNTER: ICD-10-CM
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
## 2024-09-24 DIAGNOSIS — S93.402A SPRAIN OF LEFT ANKLE, UNSPECIFIED LIGAMENT, INITIAL ENCOUNTER: ICD-10-CM

## 2024-09-24 PROCEDURE — 99284 EMERGENCY DEPT VISIT MOD MDM: CPT

## 2024-09-24 ASSESSMENT — PAIN DESCRIPTION - DESCRIPTORS: DESCRIPTORS: ACHING

## 2024-09-24 ASSESSMENT — PAIN DESCRIPTION - PAIN TYPE: TYPE: ACUTE PAIN

## 2024-09-24 ASSESSMENT — PAIN DESCRIPTION - ONSET: ONSET: SUDDEN

## 2024-09-24 ASSESSMENT — PAIN DESCRIPTION - FREQUENCY: FREQUENCY: CONTINUOUS

## 2024-09-24 ASSESSMENT — PAIN - FUNCTIONAL ASSESSMENT: PAIN_FUNCTIONAL_ASSESSMENT: ACTIVITIES ARE NOT PREVENTED

## 2024-09-24 ASSESSMENT — PAIN SCALES - GENERAL: PAINLEVEL_OUTOF10: 4

## 2024-09-24 ASSESSMENT — PAIN DESCRIPTION - ORIENTATION: ORIENTATION: LEFT;LOWER

## 2024-09-24 ASSESSMENT — PAIN DESCRIPTION - LOCATION: LOCATION: BACK

## 2024-09-25 ENCOUNTER — APPOINTMENT (OUTPATIENT)
Facility: HOSPITAL | Age: 79
End: 2024-09-25
Payer: MEDICARE

## 2024-09-25 VITALS
SYSTOLIC BLOOD PRESSURE: 235 MMHG | HEART RATE: 77 BPM | DIASTOLIC BLOOD PRESSURE: 88 MMHG | RESPIRATION RATE: 19 BRPM | OXYGEN SATURATION: 97 % | TEMPERATURE: 97.8 F

## 2024-09-25 PROCEDURE — 73610 X-RAY EXAM OF ANKLE: CPT

## 2024-09-25 PROCEDURE — 72131 CT LUMBAR SPINE W/O DYE: CPT

## 2024-09-25 ASSESSMENT — PAIN SCALES - GENERAL: PAINLEVEL_OUTOF10: 0

## 2025-01-28 ENCOUNTER — HOSPITAL ENCOUNTER (OUTPATIENT)
Facility: HOSPITAL | Age: 80
Discharge: HOME | End: 2025-01-31
Payer: MEDICARE

## 2025-01-28 ENCOUNTER — TRANSCRIBE ORDERS (OUTPATIENT)
Facility: HOSPITAL | Age: 80
End: 2025-01-28

## 2025-01-28 DIAGNOSIS — N18.31 CHRONIC KIDNEY DISEASE (CKD) STAGE G3A/A1, MODERATELY DECREASED GLOMERULAR FILTRATION RATE (GFR) BETWEEN 45-59 ML/MIN/1.73 SQUARE METER AND ALBUMINURIA CREATININE RATIO LESS THAN 30 MG/G (HCC): ICD-10-CM

## 2025-01-28 DIAGNOSIS — N18.31 CHRONIC KIDNEY DISEASE (CKD) STAGE G3A/A1, MODERATELY DECREASED GLOMERULAR FILTRATION RATE (GFR) BETWEEN 45-59 ML/MIN/1.73 SQUARE METER AND ALBUMINURIA CREATININE RATIO LESS THAN 30 MG/G (HCC): Primary | ICD-10-CM

## 2025-01-28 DIAGNOSIS — E13.69 OTHER SPECIFIED DIABETES MELLITUS WITH OTHER SPECIFIED COMPLICATION, UNSPECIFIED WHETHER LONG TERM INSULIN USE (HCC): ICD-10-CM

## 2025-01-28 DIAGNOSIS — E55.9 AVITAMINOSIS D: ICD-10-CM

## 2025-01-28 LAB
25(OH)D3 SERPL-MCNC: 24.3 NG/ML (ref 30–100)
ALBUMIN SERPL-MCNC: 3.7 G/DL (ref 3.4–5)
ANION GAP SERPL CALC-SCNC: 8 MMOL/L (ref 3–18)
APPEARANCE UR: CLEAR
BACTERIA URNS QL MICRO: ABNORMAL /HPF
BILIRUB UR QL: NEGATIVE
BUN SERPL-MCNC: 31 MG/DL (ref 7–18)
BUN/CREAT SERPL: 19 (ref 12–20)
CALCIUM SERPL-MCNC: 10 MG/DL (ref 8.5–10.1)
CALCIUM SERPL-MCNC: 9.8 MG/DL (ref 8.5–10.1)
CHLORIDE SERPL-SCNC: 102 MMOL/L (ref 100–111)
CO2 SERPL-SCNC: 29 MMOL/L (ref 21–32)
COLOR UR: YELLOW
CREAT SERPL-MCNC: 1.64 MG/DL (ref 0.6–1.3)
CREAT UR-MCNC: 73 MG/DL (ref 30–125)
CREAT UR-MCNC: 74 MG/DL (ref 30–125)
EPITH CASTS URNS QL MICRO: ABNORMAL /LPF (ref 0–5)
GLUCOSE SERPL-MCNC: 164 MG/DL (ref 74–99)
GLUCOSE UR STRIP.AUTO-MCNC: NEGATIVE MG/DL
HGB UR QL STRIP: NEGATIVE
KETONES UR QL STRIP.AUTO: NEGATIVE MG/DL
LEUKOCYTE ESTERASE UR QL STRIP.AUTO: NEGATIVE
MICROALBUMIN UR-MCNC: 3.66 MG/DL (ref 0–3)
MICROALBUMIN/CREAT UR-RTO: 49 MG/G (ref 0–30)
NITRITE UR QL STRIP.AUTO: NEGATIVE
PH UR STRIP: 5.5 (ref 5–8)
PHOSPHATE SERPL-MCNC: 3.4 MG/DL (ref 2.5–4.9)
POTASSIUM SERPL-SCNC: 4 MMOL/L (ref 3.5–5.5)
PROT UR STRIP-MCNC: NEGATIVE MG/DL
PROT UR-MCNC: 12 MG/DL
PTH-INTACT SERPL-MCNC: 83.7 PG/ML (ref 18.4–88)
RBC #/AREA URNS HPF: ABNORMAL /HPF (ref 0–5)
SODIUM SERPL-SCNC: 139 MMOL/L (ref 136–145)
SP GR UR REFRACTOMETRY: 1.01 (ref 1–1.03)
URATE SERPL-MCNC: 7.9 MG/DL (ref 2.6–7.2)
UROBILINOGEN UR QL STRIP.AUTO: 0.2 EU/DL (ref 0.2–1)
WBC URNS QL MICRO: ABNORMAL /HPF (ref 0–5)
YEAST URNS QL MICRO: ABNORMAL

## 2025-01-28 PROCEDURE — 82043 UR ALBUMIN QUANTITATIVE: CPT

## 2025-01-28 PROCEDURE — 81001 URINALYSIS AUTO W/SCOPE: CPT

## 2025-01-28 PROCEDURE — 80069 RENAL FUNCTION PANEL: CPT

## 2025-01-28 PROCEDURE — 83521 IG LIGHT CHAINS FREE EACH: CPT

## 2025-01-28 PROCEDURE — 84550 ASSAY OF BLOOD/URIC ACID: CPT

## 2025-01-28 PROCEDURE — 84165 PROTEIN E-PHORESIS SERUM: CPT

## 2025-01-28 PROCEDURE — 84156 ASSAY OF PROTEIN URINE: CPT

## 2025-01-28 PROCEDURE — 36415 COLL VENOUS BLD VENIPUNCTURE: CPT

## 2025-01-28 PROCEDURE — 82570 ASSAY OF URINE CREATININE: CPT

## 2025-01-28 PROCEDURE — 83970 ASSAY OF PARATHORMONE: CPT

## 2025-01-28 PROCEDURE — 82306 VITAMIN D 25 HYDROXY: CPT

## 2025-01-29 LAB
KAPPA LC FREE SER-MCNC: 53 MG/L (ref 3.3–19.4)
KAPPA LC FREE/LAMBDA FREE SER: 1.44 (ref 0.26–1.65)
LAMBDA LC FREE SERPL-MCNC: 36.7 MG/L (ref 5.7–26.3)

## 2025-02-04 LAB
ALBUMIN SERPL ELPH-MCNC: 3.7 G/DL (ref 2.9–4.4)
ALBUMIN/GLOB SERPL: 1 (ref 0.7–1.7)
ALPHA1 GLOB SERPL ELPH-MCNC: 0.2 G/DL (ref 0–0.4)
ALPHA2 GLOB SERPL ELPH-MCNC: 0.8 G/DL (ref 0.4–1)
B-GLOBULIN SERPL ELPH-MCNC: 1 G/DL (ref 0.7–1.3)
GAMMA GLOB SERPL ELPH-MCNC: 1.6 G/DL (ref 0.4–1.8)
GLOBULIN SER CALC-MCNC: 3.6 G/DL (ref 2.2–3.9)
M PROTEIN SERPL ELPH-MCNC: NORMAL G/DL
PROT SERPL-MCNC: 7.3 G/DL (ref 6–8.5)

## 2025-08-15 ENCOUNTER — TRANSCRIBE ORDERS (OUTPATIENT)
Facility: HOSPITAL | Age: 80
End: 2025-08-15

## 2025-08-15 ENCOUNTER — HOSPITAL ENCOUNTER (OUTPATIENT)
Facility: HOSPITAL | Age: 80
Discharge: HOME | End: 2025-08-18
Payer: MEDICARE

## 2025-08-15 DIAGNOSIS — E11.69 TYPE 2 DIABETES MELLITUS WITH OTHER SPECIFIED COMPLICATION, WITHOUT LONG-TERM CURRENT USE OF INSULIN (HCC): ICD-10-CM

## 2025-08-15 DIAGNOSIS — E55.9 VITAMIN D DEFICIENCY: ICD-10-CM

## 2025-08-15 DIAGNOSIS — N18.31 CHRONIC KIDNEY DISEASE (CKD) STAGE G3A/A1, MODERATELY DECREASED GLOMERULAR FILTRATION RATE (GFR) BETWEEN 45-59 ML/MIN/1.73 SQUARE METER AND ALBUMINURIA CREATININE RATIO LESS THAN 30 MG/G (HCC): Primary | ICD-10-CM

## 2025-08-15 DIAGNOSIS — N18.31 CHRONIC KIDNEY DISEASE (CKD) STAGE G3A/A1, MODERATELY DECREASED GLOMERULAR FILTRATION RATE (GFR) BETWEEN 45-59 ML/MIN/1.73 SQUARE METER AND ALBUMINURIA CREATININE RATIO LESS THAN 30 MG/G (HCC): ICD-10-CM

## 2025-08-15 LAB
25(OH)D3 SERPL-MCNC: 27.3 NG/ML (ref 30–100)
ALBUMIN SERPL-MCNC: 3.5 G/DL (ref 3.4–5)
ALP SERPL-CCNC: 89 U/L (ref 45–117)
ANION GAP SERPL CALC-SCNC: 11 MMOL/L (ref 3–18)
APPEARANCE UR: ABNORMAL
BACTERIA URNS QL MICRO: ABNORMAL /HPF
BASOPHILS # BLD: 0.04 K/UL (ref 0–0.1)
BASOPHILS NFR BLD: 0.7 % (ref 0–2)
BILIRUB UR QL: NEGATIVE
BUN SERPL-MCNC: 29 MG/DL (ref 6–23)
BUN/CREAT SERPL: 23 (ref 12–20)
CALCIUM SERPL-MCNC: 10 MG/DL (ref 8.5–10.1)
CALCIUM SERPL-MCNC: 9.9 MG/DL (ref 8.5–10.1)
CHLORIDE SERPL-SCNC: 102 MMOL/L (ref 98–107)
CO2 SERPL-SCNC: 25 MMOL/L (ref 21–32)
COLOR UR: YELLOW
CREAT SERPL-MCNC: 1.27 MG/DL (ref 0.6–1.3)
CREAT UR-MCNC: 105 MG/DL (ref 30–125)
CREAT UR-MCNC: 105 MG/DL (ref 30–125)
CREAT UR-MCNC: 106 MG/DL (ref 30–125)
DIFFERENTIAL METHOD BLD: NORMAL
EOSINOPHIL # BLD: 0.18 K/UL (ref 0–0.4)
EOSINOPHIL NFR BLD: 3.3 % (ref 0–5)
EPITH CASTS URNS QL MICRO: ABNORMAL /LPF (ref 0–5)
ERYTHROCYTE [DISTWIDTH] IN BLOOD BY AUTOMATED COUNT: 13.5 % (ref 11.6–14.5)
GLUCOSE SERPL-MCNC: 172 MG/DL (ref 74–108)
GLUCOSE UR STRIP.AUTO-MCNC: >1000 MG/DL
HCT VFR BLD AUTO: 39.5 % (ref 35–45)
HGB BLD-MCNC: 12.4 G/DL (ref 12–16)
HGB UR QL STRIP: NEGATIVE
IMM GRANULOCYTES # BLD AUTO: 0.01 K/UL (ref 0–0.04)
IMM GRANULOCYTES NFR BLD AUTO: 0.2 % (ref 0–0.5)
KETONES UR QL STRIP.AUTO: NEGATIVE MG/DL
LEUKOCYTE ESTERASE UR QL STRIP.AUTO: ABNORMAL
LYMPHOCYTES # BLD: 1.58 K/UL (ref 0.9–3.6)
LYMPHOCYTES NFR BLD: 29.3 % (ref 21–52)
MAGNESIUM SERPL-MCNC: 2.1 MG/DL (ref 1.6–2.6)
MCH RBC QN AUTO: 29.4 PG (ref 24–34)
MCHC RBC AUTO-ENTMCNC: 31.4 G/DL (ref 31–37)
MCV RBC AUTO: 93.6 FL (ref 78–100)
MICROALBUMIN UR-MCNC: 6.58 MG/DL (ref 0–3)
MICROALBUMIN/CREAT UR-RTO: 63 MG/G (ref 0–30)
MONOCYTES # BLD: 0.51 K/UL (ref 0.05–1.2)
MONOCYTES NFR BLD: 9.5 % (ref 3–10)
NEUTS SEG # BLD: 3.07 K/UL (ref 1.8–8)
NEUTS SEG NFR BLD: 57 % (ref 40–73)
NITRITE UR QL STRIP.AUTO: NEGATIVE
NRBC # BLD: 0 K/UL (ref 0–0.01)
NRBC BLD-RTO: 0 PER 100 WBC
PH UR STRIP: 5 (ref 5–8)
PHOSPHATE SERPL-MCNC: 3.7 MG/DL (ref 2.5–4.9)
PLATELET # BLD AUTO: 253 K/UL (ref 135–420)
PMV BLD AUTO: 9.7 FL (ref 9.2–11.8)
POTASSIUM SERPL-SCNC: 4.1 MMOL/L (ref 3.5–5.5)
PROT UR STRIP-MCNC: ABNORMAL MG/DL
PROT UR-MCNC: 21 MG/DL (ref 0–12)
PROT UR-MCNC: 21 MG/DL (ref 0–12)
PROT/CREAT UR-RTO: 0.2
PTH-INTACT SERPL-MCNC: 74.6 PG/ML (ref 15–65)
RBC # BLD AUTO: 4.22 M/UL (ref 4.2–5.3)
RBC #/AREA URNS HPF: ABNORMAL /HPF (ref 0–5)
SODIUM SERPL-SCNC: 139 MMOL/L (ref 136–145)
SP GR UR REFRACTOMETRY: 1.02 (ref 1–1.03)
URATE SERPL-MCNC: 5 MG/DL (ref 2.6–7.2)
UROBILINOGEN UR QL STRIP.AUTO: 0.2 EU/DL (ref 0.2–1)
WBC # BLD AUTO: 5.4 K/UL (ref 4.6–13.2)
WBC URNS QL MICRO: ABNORMAL /HPF (ref 0–5)
YEAST URNS QL MICRO: ABNORMAL

## 2025-08-15 PROCEDURE — 82306 VITAMIN D 25 HYDROXY: CPT

## 2025-08-15 PROCEDURE — 82043 UR ALBUMIN QUANTITATIVE: CPT

## 2025-08-15 PROCEDURE — 84550 ASSAY OF BLOOD/URIC ACID: CPT

## 2025-08-15 PROCEDURE — 82570 ASSAY OF URINE CREATININE: CPT

## 2025-08-15 PROCEDURE — 85025 COMPLETE CBC W/AUTO DIFF WBC: CPT

## 2025-08-15 PROCEDURE — 83735 ASSAY OF MAGNESIUM: CPT

## 2025-08-15 PROCEDURE — 80069 RENAL FUNCTION PANEL: CPT

## 2025-08-15 PROCEDURE — 84075 ASSAY ALKALINE PHOSPHATASE: CPT

## 2025-08-15 PROCEDURE — 36415 COLL VENOUS BLD VENIPUNCTURE: CPT

## 2025-08-15 PROCEDURE — 84156 ASSAY OF PROTEIN URINE: CPT

## 2025-08-15 PROCEDURE — 83970 ASSAY OF PARATHORMONE: CPT

## 2025-08-15 PROCEDURE — 81001 URINALYSIS AUTO W/SCOPE: CPT
